# Patient Record
Sex: MALE | Race: WHITE | NOT HISPANIC OR LATINO | Employment: FULL TIME | URBAN - METROPOLITAN AREA
[De-identification: names, ages, dates, MRNs, and addresses within clinical notes are randomized per-mention and may not be internally consistent; named-entity substitution may affect disease eponyms.]

---

## 2022-07-13 ENCOUNTER — APPOINTMENT (INPATIENT)
Dept: NON INVASIVE DIAGNOSTICS | Facility: HOSPITAL | Age: 57
DRG: 247 | End: 2022-07-13
Payer: COMMERCIAL

## 2022-07-13 ENCOUNTER — HOSPITAL ENCOUNTER (EMERGENCY)
Facility: HOSPITAL | Age: 57
End: 2022-07-13
Attending: EMERGENCY MEDICINE | Admitting: EMERGENCY MEDICINE
Payer: COMMERCIAL

## 2022-07-13 ENCOUNTER — DOCUMENTATION (OUTPATIENT)
Dept: NON INVASIVE DIAGNOSTICS | Facility: HOSPITAL | Age: 57
End: 2022-07-13

## 2022-07-13 ENCOUNTER — HOSPITAL ENCOUNTER (INPATIENT)
Facility: HOSPITAL | Age: 57
LOS: 2 days | Discharge: HOME | DRG: 247 | End: 2022-07-15
Attending: INTERNAL MEDICINE | Admitting: INTERNAL MEDICINE
Payer: COMMERCIAL

## 2022-07-13 ENCOUNTER — APPOINTMENT (EMERGENCY)
Dept: RADIOLOGY | Facility: HOSPITAL | Age: 57
End: 2022-07-13
Payer: COMMERCIAL

## 2022-07-13 VITALS
DIASTOLIC BLOOD PRESSURE: 75 MMHG | HEART RATE: 75 BPM | SYSTOLIC BLOOD PRESSURE: 117 MMHG | WEIGHT: 187.83 LBS | BODY MASS INDEX: 30.32 KG/M2 | RESPIRATION RATE: 21 BRPM | OXYGEN SATURATION: 98 % | TEMPERATURE: 98.3 F

## 2022-07-13 DIAGNOSIS — I21.3 STEMI (ST ELEVATION MYOCARDIAL INFARCTION) (HCC): Primary | ICD-10-CM

## 2022-07-13 PROBLEM — I21.02 STEMI INVOLVING LEFT ANTERIOR DESCENDING CORONARY ARTERY (HCC): Status: ACTIVE | Noted: 2022-07-13

## 2022-07-13 LAB
ALBUMIN SERPL BCP-MCNC: 3.7 G/DL (ref 3.5–5)
ALP SERPL-CCNC: 104 U/L (ref 46–116)
ALT SERPL W P-5'-P-CCNC: 84 U/L (ref 12–78)
ANION GAP SERPL CALCULATED.3IONS-SCNC: 12 MMOL/L (ref 4–13)
AORTIC ROOT: 2.8 CM
APICAL FOUR CHAMBER EJECTION FRACTION: 54 %
APTT PPP: 27 SECONDS (ref 23–37)
ASCENDING AORTA: 3.1 CM
AST SERPL W P-5'-P-CCNC: 35 U/L (ref 5–45)
ATRIAL RATE: 74 BPM
ATRIAL RATE: 86 BPM
BASOPHILS # BLD AUTO: 0.03 THOUSANDS/ΜL (ref 0–0.1)
BASOPHILS NFR BLD AUTO: 0 % (ref 0–1)
BILIRUB SERPL-MCNC: 0.65 MG/DL (ref 0.2–1)
BUN SERPL-MCNC: 11 MG/DL (ref 5–25)
CALCIUM SERPL-MCNC: 9 MG/DL (ref 8.3–10.1)
CARDIAC TROPONIN I PNL SERPL HS: 485 NG/L
CARDIAC TROPONIN I PNL SERPL HS: ABNORMAL NG/L
CHLORIDE SERPL-SCNC: 102 MMOL/L (ref 100–108)
CHOLEST SERPL-MCNC: 222 MG/DL
CO2 SERPL-SCNC: 22 MMOL/L (ref 21–32)
CREAT SERPL-MCNC: 1.2 MG/DL (ref 0.6–1.3)
E WAVE DECELERATION TIME: 156 MS
EOSINOPHIL # BLD AUTO: 0.09 THOUSAND/ΜL (ref 0–0.61)
EOSINOPHIL NFR BLD AUTO: 1 % (ref 0–6)
ERYTHROCYTE [DISTWIDTH] IN BLOOD BY AUTOMATED COUNT: 12.3 % (ref 11.6–15.1)
FRACTIONAL SHORTENING: 31 (ref 28–44)
GFR SERPL CREATININE-BSD FRML MDRD: 67 ML/MIN/1.73SQ M
GLUCOSE SERPL-MCNC: 170 MG/DL (ref 65–140)
GLUCOSE SERPL-MCNC: 171 MG/DL (ref 65–140)
HCT VFR BLD AUTO: 46.8 % (ref 36.5–49.3)
HDLC SERPL-MCNC: 46 MG/DL
HGB BLD-MCNC: 15.8 G/DL (ref 12–17)
IMM GRANULOCYTES # BLD AUTO: 0.02 THOUSAND/UL (ref 0–0.2)
IMM GRANULOCYTES NFR BLD AUTO: 0 % (ref 0–2)
INR PPP: 0.96 (ref 0.84–1.19)
INTERVENTRICULAR SEPTUM IN DIASTOLE (PARASTERNAL SHORT AXIS VIEW): 1 CM
INTERVENTRICULAR SEPTUM: 1 CM (ref 0.6–1.1)
KCT BLD-ACNC: 231 SEC (ref 89–137)
LAAS-AP2: 12.1 CM2
LAAS-AP4: 14.5 CM2
LDLC SERPL CALC-MCNC: 153 MG/DL (ref 0–100)
LDLC SERPL DIRECT ASSAY-MCNC: 154 MG/DL (ref 0–100)
LEFT ATRIUM SIZE: 2.8 CM
LEFT INTERNAL DIMENSION IN SYSTOLE: 3.3 CM (ref 2.1–4)
LEFT VENTRICULAR INTERNAL DIMENSION IN DIASTOLE: 4.8 CM (ref 3.5–6)
LEFT VENTRICULAR POSTERIOR WALL IN END DIASTOLE: 0.9 CM
LEFT VENTRICULAR STROKE VOLUME: 63 ML
LVSV (TEICH): 63 ML
LYMPHOCYTES # BLD AUTO: 1.94 THOUSANDS/ΜL (ref 0.6–4.47)
LYMPHOCYTES NFR BLD AUTO: 25 % (ref 14–44)
MAGNESIUM SERPL-MCNC: 1.8 MG/DL (ref 1.6–2.6)
MCH RBC QN AUTO: 30.6 PG (ref 26.8–34.3)
MCHC RBC AUTO-ENTMCNC: 33.8 G/DL (ref 31.4–37.4)
MCV RBC AUTO: 91 FL (ref 82–98)
MONOCYTES # BLD AUTO: 0.74 THOUSAND/ΜL (ref 0.17–1.22)
MONOCYTES NFR BLD AUTO: 10 % (ref 4–12)
MV E'TISSUE VEL-SEP: 8 CM/S
MV PEAK A VEL: 0.89 M/S
MV PEAK E VEL: 90 CM/S
MV STENOSIS PRESSURE HALF TIME: 45 MS
MV VALVE AREA P 1/2 METHOD: 4.89
NEUTROPHILS # BLD AUTO: 4.99 THOUSANDS/ΜL (ref 1.85–7.62)
NEUTS SEG NFR BLD AUTO: 64 % (ref 43–75)
NONHDLC SERPL-MCNC: 176 MG/DL
NRBC BLD AUTO-RTO: 0 /100 WBCS
NT-PROBNP SERPL-MCNC: 864 PG/ML
P AXIS: 59 DEGREES
P AXIS: 75 DEGREES
PLATELET # BLD AUTO: 304 THOUSANDS/UL (ref 149–390)
PMV BLD AUTO: 8.4 FL (ref 8.9–12.7)
POTASSIUM SERPL-SCNC: 3.6 MMOL/L (ref 3.5–5.3)
PR INTERVAL: 124 MS
PR INTERVAL: 124 MS
PROT SERPL-MCNC: 7 G/DL (ref 6.4–8.2)
PROTHROMBIN TIME: 12.3 SECONDS (ref 11.6–14.5)
QRS AXIS: 10 DEGREES
QRS AXIS: 95 DEGREES
QRSD INTERVAL: 78 MS
QRSD INTERVAL: 84 MS
QT INTERVAL: 390 MS
QT INTERVAL: 404 MS
QTC INTERVAL: 432 MS
QTC INTERVAL: 483 MS
RBC # BLD AUTO: 5.17 MILLION/UL (ref 3.88–5.62)
RIGHT ATRIUM AREA SYSTOLE A4C: 13.1 CM2
RIGHT VENTRICLE ID DIMENSION: 3.3 CM
SL CV LEFT ATRIUM LENGTH A2C: 4.5 CM
SL CV LV EF: 50
SL CV PED ECHO LEFT VENTRICLE DIASTOLIC VOLUME (MOD BIPLANE) 2D: 107 ML
SL CV PED ECHO LEFT VENTRICLE SYSTOLIC VOLUME (MOD BIPLANE) 2D: 44 ML
SODIUM SERPL-SCNC: 136 MMOL/L (ref 136–145)
SPECIMEN SOURCE: ABNORMAL
T WAVE AXIS: 2 DEGREES
T WAVE AXIS: 66 DEGREES
TRIGL SERPL-MCNC: 114 MG/DL
VENTRICULAR RATE: 74 BPM
VENTRICULAR RATE: 86 BPM
WBC # BLD AUTO: 7.81 THOUSAND/UL (ref 4.31–10.16)

## 2022-07-13 PROCEDURE — 93005 ELECTROCARDIOGRAM TRACING: CPT

## 2022-07-13 PROCEDURE — 83036 HEMOGLOBIN GLYCOSYLATED A1C: CPT | Performed by: STUDENT IN AN ORGANIZED HEALTH CARE EDUCATION/TRAINING PROGRAM

## 2022-07-13 PROCEDURE — 93010 ELECTROCARDIOGRAM REPORT: CPT | Performed by: INTERNAL MEDICINE

## 2022-07-13 PROCEDURE — C1887 CATHETER, GUIDING: HCPCS | Performed by: INTERNAL MEDICINE

## 2022-07-13 PROCEDURE — C1769 GUIDE WIRE: HCPCS | Performed by: INTERNAL MEDICINE

## 2022-07-13 PROCEDURE — 99153 MOD SED SAME PHYS/QHP EA: CPT | Performed by: INTERNAL MEDICINE

## 2022-07-13 PROCEDURE — 83735 ASSAY OF MAGNESIUM: CPT | Performed by: PHYSICIAN ASSISTANT

## 2022-07-13 PROCEDURE — 84484 ASSAY OF TROPONIN QUANT: CPT

## 2022-07-13 PROCEDURE — 99285 EMERGENCY DEPT VISIT HI MDM: CPT

## 2022-07-13 PROCEDURE — 36415 COLL VENOUS BLD VENIPUNCTURE: CPT | Performed by: STUDENT IN AN ORGANIZED HEALTH CARE EDUCATION/TRAINING PROGRAM

## 2022-07-13 PROCEDURE — C1874 STENT, COATED/COV W/DEL SYS: HCPCS | Performed by: INTERNAL MEDICINE

## 2022-07-13 PROCEDURE — NC001 PR NO CHARGE: Performed by: INTERNAL MEDICINE

## 2022-07-13 PROCEDURE — 85347 COAGULATION TIME ACTIVATED: CPT

## 2022-07-13 PROCEDURE — 80053 COMPREHEN METABOLIC PANEL: CPT | Performed by: PHYSICIAN ASSISTANT

## 2022-07-13 PROCEDURE — 85610 PROTHROMBIN TIME: CPT | Performed by: PHYSICIAN ASSISTANT

## 2022-07-13 PROCEDURE — 82948 REAGENT STRIP/BLOOD GLUCOSE: CPT

## 2022-07-13 PROCEDURE — 85025 COMPLETE CBC W/AUTO DIFF WBC: CPT | Performed by: PHYSICIAN ASSISTANT

## 2022-07-13 PROCEDURE — 93306 TTE W/DOPPLER COMPLETE: CPT

## 2022-07-13 PROCEDURE — 93454 CORONARY ARTERY ANGIO S&I: CPT | Performed by: INTERNAL MEDICINE

## 2022-07-13 PROCEDURE — 99232 SBSQ HOSP IP/OBS MODERATE 35: CPT | Performed by: INTERNAL MEDICINE

## 2022-07-13 PROCEDURE — 93306 TTE W/DOPPLER COMPLETE: CPT | Performed by: INTERNAL MEDICINE

## 2022-07-13 PROCEDURE — 83036 HEMOGLOBIN GLYCOSYLATED A1C: CPT

## 2022-07-13 PROCEDURE — 93458 L HRT ARTERY/VENTRICLE ANGIO: CPT | Performed by: INTERNAL MEDICINE

## 2022-07-13 PROCEDURE — 83880 ASSAY OF NATRIURETIC PEPTIDE: CPT | Performed by: PHYSICIAN ASSISTANT

## 2022-07-13 PROCEDURE — 99152 MOD SED SAME PHYS/QHP 5/>YRS: CPT | Performed by: INTERNAL MEDICINE

## 2022-07-13 PROCEDURE — C1894 INTRO/SHEATH, NON-LASER: HCPCS | Performed by: INTERNAL MEDICINE

## 2022-07-13 PROCEDURE — 96374 THER/PROPH/DIAG INJ IV PUSH: CPT

## 2022-07-13 PROCEDURE — 80061 LIPID PANEL: CPT | Performed by: PHYSICIAN ASSISTANT

## 2022-07-13 PROCEDURE — C9606 PERC D-E COR REVASC W AMI S: HCPCS | Performed by: INTERNAL MEDICINE

## 2022-07-13 PROCEDURE — 85730 THROMBOPLASTIN TIME PARTIAL: CPT | Performed by: PHYSICIAN ASSISTANT

## 2022-07-13 PROCEDURE — 027035Z DILATION OF CORONARY ARTERY, ONE ARTERY WITH TWO DRUG-ELUTING INTRALUMINAL DEVICES, PERCUTANEOUS APPROACH: ICD-10-PCS | Performed by: INTERNAL MEDICINE

## 2022-07-13 PROCEDURE — 71045 X-RAY EXAM CHEST 1 VIEW: CPT

## 2022-07-13 PROCEDURE — 99291 CRITICAL CARE FIRST HOUR: CPT | Performed by: EMERGENCY MEDICINE

## 2022-07-13 PROCEDURE — 84484 ASSAY OF TROPONIN QUANT: CPT | Performed by: PHYSICIAN ASSISTANT

## 2022-07-13 PROCEDURE — 92941 PRQ TRLML REVSC TOT OCCL AMI: CPT | Performed by: INTERNAL MEDICINE

## 2022-07-13 PROCEDURE — B2111ZZ FLUOROSCOPY OF MULTIPLE CORONARY ARTERIES USING LOW OSMOLAR CONTRAST: ICD-10-PCS | Performed by: INTERNAL MEDICINE

## 2022-07-13 PROCEDURE — C1725 CATH, TRANSLUMIN NON-LASER: HCPCS | Performed by: INTERNAL MEDICINE

## 2022-07-13 PROCEDURE — 83721 ASSAY OF BLOOD LIPOPROTEIN: CPT | Performed by: STUDENT IN AN ORGANIZED HEALTH CARE EDUCATION/TRAINING PROGRAM

## 2022-07-13 DEVICE — XIENCE SKYPOINT™ EVEROLIMUS ELUTING CORONARY STENT SYSTEM 3.00 MM X 18 MM / RAPID-EXCHANGE
Type: IMPLANTABLE DEVICE | Site: CORONARY | Status: FUNCTIONAL
Brand: XIENCE SKYPOINT™

## 2022-07-13 DEVICE — XIENCE SKYPOINT™ EVEROLIMUS ELUTING CORONARY STENT SYSTEM 3.50 MM X 12 MM / RAPID-EXCHANGE
Type: IMPLANTABLE DEVICE | Site: CORONARY | Status: FUNCTIONAL
Brand: XIENCE SKYPOINT™

## 2022-07-13 RX ORDER — FENTANYL CITRATE 50 UG/ML
INJECTION, SOLUTION INTRAMUSCULAR; INTRAVENOUS AS NEEDED
Status: DISCONTINUED | OUTPATIENT
Start: 2022-07-13 | End: 2022-07-13 | Stop reason: HOSPADM

## 2022-07-13 RX ORDER — ONDANSETRON 2 MG/ML
4 INJECTION INTRAMUSCULAR; INTRAVENOUS EVERY 6 HOURS PRN
Status: DISCONTINUED | OUTPATIENT
Start: 2022-07-13 | End: 2022-07-15 | Stop reason: HOSPADM

## 2022-07-13 RX ORDER — SODIUM CHLORIDE 9 MG/ML
100 INJECTION, SOLUTION INTRAVENOUS CONTINUOUS
Status: DISPENSED | OUTPATIENT
Start: 2022-07-13 | End: 2022-07-13

## 2022-07-13 RX ORDER — VERAPAMIL HCL 2.5 MG/ML
AMPUL (ML) INTRAVENOUS AS NEEDED
Status: DISCONTINUED | OUTPATIENT
Start: 2022-07-13 | End: 2022-07-13 | Stop reason: HOSPADM

## 2022-07-13 RX ORDER — METOPROLOL TARTRATE 50 MG/1
25 TABLET, FILM COATED ORAL EVERY 12 HOURS SCHEDULED
Status: DISCONTINUED | OUTPATIENT
Start: 2022-07-13 | End: 2022-07-14

## 2022-07-13 RX ORDER — MIDAZOLAM HYDROCHLORIDE 2 MG/2ML
INJECTION, SOLUTION INTRAMUSCULAR; INTRAVENOUS AS NEEDED
Status: DISCONTINUED | OUTPATIENT
Start: 2022-07-13 | End: 2022-07-13 | Stop reason: HOSPADM

## 2022-07-13 RX ORDER — NITROGLYCERIN 20 MG/100ML
INJECTION INTRAVENOUS AS NEEDED
Status: DISCONTINUED | OUTPATIENT
Start: 2022-07-13 | End: 2022-07-13 | Stop reason: HOSPADM

## 2022-07-13 RX ORDER — HEPARIN SODIUM 5000 [USP'U]/ML
5000 INJECTION, SOLUTION INTRAVENOUS; SUBCUTANEOUS EVERY 8 HOURS SCHEDULED
Status: DISCONTINUED | OUTPATIENT
Start: 2022-07-13 | End: 2022-07-15 | Stop reason: HOSPADM

## 2022-07-13 RX ORDER — NICOTINE 21 MG/24HR
1 PATCH, TRANSDERMAL 24 HOURS TRANSDERMAL DAILY
Status: DISCONTINUED | OUTPATIENT
Start: 2022-07-13 | End: 2022-07-15 | Stop reason: HOSPADM

## 2022-07-13 RX ORDER — HEPARIN SODIUM 1000 [USP'U]/ML
4000 INJECTION, SOLUTION INTRAVENOUS; SUBCUTANEOUS ONCE
Status: COMPLETED | OUTPATIENT
Start: 2022-07-13 | End: 2022-07-13

## 2022-07-13 RX ORDER — HEPARIN SODIUM 1000 [USP'U]/ML
INJECTION, SOLUTION INTRAVENOUS; SUBCUTANEOUS AS NEEDED
Status: DISCONTINUED | OUTPATIENT
Start: 2022-07-13 | End: 2022-07-13 | Stop reason: HOSPADM

## 2022-07-13 RX ORDER — SODIUM CHLORIDE 9 MG/ML
3 INJECTION INTRAVENOUS
Status: DISCONTINUED | OUTPATIENT
Start: 2022-07-13 | End: 2022-07-13 | Stop reason: HOSPADM

## 2022-07-13 RX ORDER — SODIUM CHLORIDE 9 MG/ML
INJECTION, SOLUTION INTRAVENOUS
Status: COMPLETED | OUTPATIENT
Start: 2022-07-13 | End: 2022-07-13

## 2022-07-13 RX ORDER — ATORVASTATIN CALCIUM 80 MG/1
80 TABLET, FILM COATED ORAL
Status: DISCONTINUED | OUTPATIENT
Start: 2022-07-13 | End: 2022-07-15 | Stop reason: HOSPADM

## 2022-07-13 RX ORDER — ASPIRIN 81 MG/1
81 TABLET ORAL DAILY
Status: DISCONTINUED | OUTPATIENT
Start: 2022-07-14 | End: 2022-07-15 | Stop reason: HOSPADM

## 2022-07-13 RX ORDER — ONDANSETRON 2 MG/ML
1 INJECTION INTRAMUSCULAR; INTRAVENOUS ONCE
Status: COMPLETED | OUTPATIENT
Start: 2022-07-13 | End: 2022-07-13

## 2022-07-13 RX ORDER — NITROGLYCERIN 0.4 MG/1
0.4 TABLET SUBLINGUAL
Status: DISCONTINUED | OUTPATIENT
Start: 2022-07-13 | End: 2022-07-15 | Stop reason: HOSPADM

## 2022-07-13 RX ORDER — ACETAMINOPHEN 325 MG/1
650 TABLET ORAL EVERY 4 HOURS PRN
Status: DISCONTINUED | OUTPATIENT
Start: 2022-07-13 | End: 2022-07-15 | Stop reason: HOSPADM

## 2022-07-13 RX ORDER — LIDOCAINE HYDROCHLORIDE 10 MG/ML
INJECTION, SOLUTION EPIDURAL; INFILTRATION; INTRACAUDAL; PERINEURAL AS NEEDED
Status: DISCONTINUED | OUTPATIENT
Start: 2022-07-13 | End: 2022-07-13 | Stop reason: HOSPADM

## 2022-07-13 RX ADMIN — HEPARIN SODIUM 5000 UNITS: 5000 INJECTION INTRAVENOUS; SUBCUTANEOUS at 21:31

## 2022-07-13 RX ADMIN — ATORVASTATIN CALCIUM 80 MG: 80 TABLET, FILM COATED ORAL at 17:23

## 2022-07-13 RX ADMIN — METOPROLOL TARTRATE 25 MG: 50 TABLET, FILM COATED ORAL at 21:31

## 2022-07-13 RX ADMIN — TICAGRELOR 90 MG: 90 TABLET ORAL at 17:23

## 2022-07-13 RX ADMIN — SODIUM CHLORIDE 100 ML/HR: 0.9 INJECTION, SOLUTION INTRAVENOUS at 12:02

## 2022-07-13 RX ADMIN — NICOTINE 1 PATCH: 14 PATCH, EXTENDED RELEASE TRANSDERMAL at 12:04

## 2022-07-13 RX ADMIN — HEPARIN SODIUM 4000 UNITS: 1000 INJECTION INTRAVENOUS; SUBCUTANEOUS at 09:41

## 2022-07-13 RX ADMIN — TICAGRELOR 180 MG: 90 TABLET ORAL at 09:38

## 2022-07-13 NOTE — ED PROVIDER NOTES
History  Chief Complaint   Patient presents with    Chest Pain     Chest pain since last night  Mid sternal pain radiating to back and shoulder blades  Pt diaphoretic on arrival        28-year-old male presents via EMS for chest pain  Chest pain feels like a pressure sensation patient's midsternal region with radiation to his back and neck  Patient reports the pain started some point overnight however greatly worsened about a half an hour prior to arrival   For EMS patient had apparent ST elevations, patient was made a MI alert on arrival with EKG showing ST elevations with inferior reciprocal changes  Patient reports diaphoresis, nausea without vomiting  He denies any pain in his extremities  He has no known medical conditions or daily medications  Patient was given 4 baby aspirin via EMS in route  None       History reviewed  No pertinent past medical history  History reviewed  No pertinent surgical history  History reviewed  No pertinent family history  I have reviewed and agree with the history as documented  E-Cigarette/Vaping     E-Cigarette/Vaping Substances     Social History     Tobacco Use    Smoking status: Current Every Day Smoker     Packs/day: 1 50     Types: Cigarettes    Smokeless tobacco: Never Used   Substance Use Topics    Alcohol use: No    Drug use: No       Review of Systems   Constitutional: Positive for diaphoresis  Respiratory: Positive for shortness of breath  Cardiovascular: Positive for chest pain  Gastrointestinal: Positive for nausea  Negative for abdominal pain and vomiting  Musculoskeletal: Negative for arthralgias  Neurological: Negative for weakness and numbness  All other systems reviewed and are negative  Physical Exam  Physical Exam  Vitals reviewed  Constitutional:       Appearance: He is well-developed  He is ill-appearing and diaphoretic  HENT:      Head: Normocephalic and atraumatic        Right Ear: External ear normal  Left Ear: External ear normal       Nose: Nose normal    Eyes:      General: No scleral icterus  Right eye: No discharge  Left eye: No discharge  Extraocular Movements: Extraocular movements intact  Pupils: Pupils are equal, round, and reactive to light  Cardiovascular:      Rate and Rhythm: Normal rate and regular rhythm  Pulses: Normal pulses  Radial pulses are 2+ on the right side and 2+ on the left side  Dorsalis pedis pulses are 2+ on the right side and 2+ on the left side  Heart sounds: Normal heart sounds  Pulmonary:      Effort: Pulmonary effort is normal  No respiratory distress  Abdominal:      General: There is no distension  Palpations: Abdomen is soft  Tenderness: There is no abdominal tenderness  There is no guarding or rebound  Musculoskeletal:         General: No deformity or signs of injury  Right lower leg: No edema  Left lower leg: No edema  Skin:     General: Skin is warm  Coloration: Skin is not jaundiced or pale  Neurological:      General: No focal deficit present  Mental Status: He is alert  Mental status is at baseline           Vital Signs  ED Triage Vitals   Temperature Pulse Respirations Blood Pressure SpO2   07/13/22 0929 07/13/22 0929 07/13/22 0929 07/13/22 0929 07/13/22 0929   98 3 °F (36 8 °C) 87 (!) 24 117/72 98 %      Temp Source Heart Rate Source Patient Position - Orthostatic VS BP Location FiO2 (%)   07/13/22 0929 07/13/22 0929 -- -- --   Tympanic Monitor         Pain Score       07/13/22 0931       6           Vitals:    07/13/22 0940 07/13/22 0945 07/13/22 0950 07/13/22 0955   BP: 117/73 116/75 112/73 117/75   Pulse: 78 81 73 75         Visual Acuity      ED Medications  Medications   ondansetron (FOR EMS ONLY) (ZOFRAN) 4 mg/2 mL injection 4 mg (0 mg Does not apply Given to EMS 7/13/22 0937)   ticagrelor (BRILINTA) tablet 180 mg (180 mg Oral Given 7/13/22 0938)   heparin (porcine) injection 4,000 Units (4,000 Units Intravenous Given 7/13/22 0941)       Diagnostic Studies  Results Reviewed     Procedure Component Value Units Date/Time    LDL cholesterol, direct [[id number]]  (Abnormal) Collected: 07/13/22 0932    Lab Status: Final result Specimen: Blood Updated: 07/13/22 1035     LDL Direct 154 mg/dl     Narrative:      LDL Cholesterol:        Optimal           <100 mg/dl      Near Optimal      100-129 mg/dl      Above Optimal       Borderline High  130-159 mg/dl       High             160-189 mg/dl       Very High        >189 mg/dl    Hemoglobin A1C [[id number]] Collected: 07/13/22 0932    Lab Status:  In process Specimen: Blood from Arm, Left Updated: 07/13/22 1018    Lipid panel [[id number]]  (Abnormal) Collected: 07/13/22 0932    Lab Status: Final result Specimen: Blood from Arm, Left Updated: 07/13/22 1000     Cholesterol 222 mg/dL      Triglycerides 114 mg/dL      HDL, Direct 46 mg/dL      LDL Calculated 153 mg/dL      Non-HDL-Chol (CHOL-HDL) 176 mg/dl     Magnesium [[id number]]  (Normal) Collected: 07/13/22 0932    Lab Status: Final result Specimen: Blood from Arm, Left Updated: 07/13/22 1000     Magnesium 1 8 mg/dL     HS Troponin 0hr (reflex protocol) [[id number]]  (Abnormal) Collected: 07/13/22 0932    Lab Status: Final result Specimen: Blood from Arm, Left Updated: 07/13/22 1000     hs TnI 0hr 485 ng/L     NT-BNP PRO [[id number]]  (Abnormal) Collected: 07/13/22 0932    Lab Status: Final result Specimen: Blood from Arm, Left Updated: 07/13/22 1000     NT-proBNP 864 pg/mL     Comprehensive metabolic panel [[id number]]  (Abnormal) Collected: 07/13/22 0932    Lab Status: Final result Specimen: Blood from Arm, Left Updated: 07/13/22 0952     Sodium 136 mmol/L      Potassium 3 6 mmol/L      Chloride 102 mmol/L      CO2 22 mmol/L      ANION GAP 12 mmol/L      BUN 11 mg/dL      Creatinine 1 20 mg/dL      Glucose 171 mg/dL      Calcium 9 0 mg/dL      AST 35 U/L      ALT 84 U/L      Alkaline Phosphatase 104 U/L      Total Protein 7 0 g/dL      Albumin 3 7 g/dL      Total Bilirubin 0 65 mg/dL      eGFR 67 ml/min/1 73sq m     Narrative:      [place] guidelines for Chronic Kidney Disease (CKD):     Stage 1 with normal or high GFR (GFR > 90 mL/min/1 73 square meters)    Stage 2 Mild CKD (GFR = 60-89 mL/min/1 73 square meters)    Stage 3A Moderate CKD (GFR = 45-59 mL/min/1 73 square meters)    Stage 3B Moderate CKD (GFR = 30-44 mL/min/1 73 square meters)    Stage 4 Severe CKD (GFR = 15-29 mL/min/1 73 square meters)    Stage 5 End Stage CKD (GFR <15 mL/min/1 73 square meters)  Note: GFR calculation is accurate only with a steady state creatinine    Protime-INR [[id number]]  (Normal) Collected: 07/13/22 0932    Lab Status: Final result Specimen: Blood from Arm, Left Updated: 07/13/22 0949     Protime 12 3 seconds      INR 0 96    APTT [[id number]]  (Normal) Collected: 07/13/22 0932    Lab Status: Final result Specimen: Blood from Arm, Left Updated: 07/13/22 0949     PTT 27 seconds     CBC and differential [[id number]]  (Abnormal) Collected: 07/13/22 0932    Lab Status: Final result Specimen: Blood from Arm, Left Updated: 07/13/22 0938     WBC 7 81 Thousand/uL      RBC 5 17 Million/uL      Hemoglobin 15 8 g/dL      Hematocrit 46 8 %      MCV 91 fL      MCH 30 6 pg      MCHC 33 8 g/dL      RDW 12 3 %      MPV 8 4 fL      Platelets 508 Thousands/uL      nRBC 0 /100 WBCs      Neutrophils Relative 64 %      Immat GRANS % 0 %      Lymphocytes Relative 25 %      Monocytes Relative 10 %      Eosinophils Relative 1 %      Basophils Relative 0 %      Neutrophils Absolute 4 99 Thousands/µL      Immature Grans Absolute 0 02 Thousand/uL      Lymphocytes Absolute 1 94 Thousands/µL      Monocytes Absolute 0 74 Thousand/µL      Eosinophils Absolute 0 09 Thousand/µL      Basophils Absolute 0 03 Thousands/µL     Fingerstick Glucose (POCT) [[id number]]  (Abnormal) Collected: 07/13/22 0929    Lab Status: Final result Updated: 07/13/22 0930     POC Glucose 170 mg/dl                  XR chest 1 view portable   Final Result by Torin Diaz MD (07/13 1105)      Clear lungs  Workstation performed: [id number]                    Procedures  CriticalCare Time  Performed by: Gabby Hoang DO  Authorized by: Gabby Hoang DO     Critical care provider statement:     Critical care time (minutes):  30    Critical care was necessary to treat or prevent imminent or life-threatening deterioration of the following conditions:  Cardiac failure    Critical care was time spent personally by me on the following activities:  Obtaining history from patient or surrogate, development of treatment plan with patient or surrogate, discussions with consultants, examination of patient, evaluation of patient's response to treatment, ordering and performing treatments and interventions, review of old charts and re-evaluation of patient's condition    I assumed direction of critical care for this patient from another provider in my specialty: no               ED Course  ED Course as of 07/13/22 1549   Wed Jul 13, 2022   0940 Procedure Note: EKG  Date/Time: 07/13/22 9:40 AM   Interpreted by: Julio Cesar Jiménez  Indications / Diagnosis: CP/MI alert  ECG reviewed by me, the ED Provider: yes   The EKG demonstrates:  Rhythm: normal sinus  Intervals: normal intervals  Axis: normal axis  QRS/Blocks: normal QRS  ST Changes: ST elevations to anterior-septal with inferior reciprocal changes       0950 MI alert called/accepted by Dr Senait Orantes in about 5min                                             MDM  Number of Diagnoses or Management Options  STEMI (ST elevation myocardial infarction) [facility])  Diagnosis management comments: 60-year-old male presents for evaluation of chest pain  Patient's appearance STEMI on EKG, case discussed with Dr Caitlin Lott for STEMI alert and cath lab intervention, he accepts    Mi alert labs and medications ordered per order set, patient is arranged for transportation via helicopter  Disposition  Final diagnoses:   STEMI (ST elevation myocardial infarction) ([facility] [address] )     Time reflects when diagnosis was documented in both MDM as applicable and the Disposition within this note     Time User Action Codes Description Comment    7/13/2022 10:09 AM Reina Signs Add [I21 3] STEMI (ST elevation myocardial infarction) [facility])       ED Disposition     ED Disposition   Transfer to Another Facility-In Network    Condition   --    Date/Time   Wed Jul 13, 2022  9:47 AM    Comment   Bruno Burns should be transferred out to [facility] [place]             MD Documentation    [facility] Most Recent Value   Patient Condition The patient has been stabilized such that within reasonable medical probability, no material deterioration of the patient condition or the condition of the unborn child(valeria) is likely to result from the transfer   Reason for Transfer Level of Care needed not available at this facility   Benefits of Transfer Specialized equipment and/or services available at the receiving facility (Include comment)________________________   Risks of Transfer Potential for delay in receiving treatment, Potential deterioration of medical condition, Loss of IV, Possible worsening of condition or death during transfer, Increased discomfort during transfer   Accepting Physician Amrik Resendez [address] Name, Mary moeller   Provider Certification General risk, such as traffic hazards, adverse weather conditions, rough terrain or turbulence, possible failure of equipment (including vehicle or aircraft), or consequences of actions of persons outside the control of the transport personnel      RN Documentation    Flowsheet Row Most [address] [facility] Name, [address]  [facility] [place]   Bed Assignment cath lab   Report Given to [facility]      Follow-up Information    None         There are no discharge medications for this patient  No discharge procedures on file      PDMP Review     None          ED Provider  Electronically Signed by           Matthew Delgado DO  07/13/22 9016

## 2022-07-13 NOTE — EMTALA/ACUTE CARE TRANSFER
[address] [facility] EMERGENCY DEPARTMENT  [address] [facility] [address]  Dept: [phone]      EMTALA TRANSFER CONSENT    NAME Kaity Ragsdale                                         1965                              MRN [id number]    I have been informed of my rights regarding examination, treatment, and transfer   by Dr Gabby Hoang DO    Benefits: Specialized equipment and/or services available at the receiving facility (Include comment)________________________    Risks: Potential for delay in receiving treatment, Potential deterioration of medical condition, Loss of IV, Possible worsening of condition or death during transfer, Increased discomfort during transfer      Consent for Transfer:  I acknowledge that my medical condition has been evaluated and explained to me by the emergency department physician or other qualified medical person and/or my attending physician, who has recommended that I be transferred to the service of  Accepting Physician: Dr Arbie Saint at [address] Name, [address] : [facility]  The above potential benefits of such transfer, the potential risks associated with such transfer, and the probable risks of not being transferred have been explained to me, and I fully understand them  The doctor has explained that, in my case, the benefits of transfer outweigh the risks  I agree to be transferred  I authorize the performance of emergency medical procedures and treatments upon me in both transit and upon arrival at the receiving facility  Additionally, I authorize the release of any and all medical records to the receiving facility and request they be transported with me, if possible  I understand that the safest mode of transportation during a medical emergency is an ambulance and that the Hospital advocates the use of this mode of transport   Risks of traveling to the receiving facility by car, including absence of medical control, life sustaining equipment, such as oxygen, and medical personnel has been explained to me and I fully understand them  (MONICA CORRECT BOX BELOW)  [  ]  I consent to the stated transfer and to be transported by ambulance/helicopter  [  ]  I consent to the stated transfer, but refuse transportation by ambulance and accept full responsibility for my transportation by car  I understand the risks of non-ambulance transfers and I exonerate the Hospital and its staff from any deterioration in my condition that results from this refusal     X___________________________________________    DATE  22  TIME________  Signature of patient or legally responsible individual signing on patient behalf           RELATIONSHIP TO PATIENT_________________________          Provider Certification    NAME Candelariabonitawillis Grewal                                         1965                              MRN [id number]    A medical screening exam was performed on the above named patient  Based on the examination:    Condition Necessitating Transfer There were no encounter diagnoses      Patient Condition: The patient has been stabilized such that within reasonable medical probability, no material deterioration of the patient condition or the condition of the unborn child(valeria) is likely to result from the transfer    Reason for Transfer: Level of Care needed not available at this facility    Transfer Requirements: Facility [facility] [place]   · Space available and qualified personnel available for treatment as acknowledged by    · Agreed to accept transfer and to provide appropriate medical treatment as acknowledged by       Dr Jenean Osler  · Appropriate medical records of the examination and treatment of the patient are provided at the time of transfer   [address], [address] _______  · Transfer will be performed by qualified personnel from    and appropriate transfer equipment as required, including the use of necessary and appropriate life support measures  Provider Certification: I have examined the patient and explained the following risks and benefits of being transferred/refusing transfer to the patient/family:  General risk, such as traffic hazards, adverse weather conditions, rough terrain or turbulence, possible failure of equipment (including vehicle or aircraft), or consequences of actions of persons outside the control of the transport personnel      Based on these reasonable risks and benefits to the patient and/or the unborn child(valeria), and based upon the information available at the time of the patients examination, I certify that the medical benefits reasonably to be expected from the provision of appropriate medical treatments at another medical facility outweigh the increasing risks, if any, to the individuals medical condition, and in the case of labor to the unborn child, from effecting the transfer      X____________________________________________ DATE 07/13/22        TIME_______      ORIGINAL - SEND TO MEDICAL RECORDS   COPY - SEND WITH PATIENT DURING TRANSFER

## 2022-07-13 NOTE — H&P
Cardiology Team STEMI Inpatient - History & Physical  Ken Pope 65 y o  male MRN: [id number]  Unit/Bed#: TR05 Encounter: [id number]        PCP: No primary care provider on file  Outpatient Cardiologist: none     Risk factors:  - none     History of Present Illness            HPI:  Ken Pope is a 65 y o  male with no significant past medical history initially presented to [facility] with chief complaint of chest pain  Patient was found to have inferior STEMI based on EKG  Patient is being transferred to [facility] for cardiac catheterization  His pain started at 9 pm yesterday night with CP, nausea and SOB  Symptoms were worsening which prompted him to go to ER       On My exam, patient is alert and oriented x3  He has 3/10 chest pain which is better than yesterday  He was anxious and diaphoretic  He is an active smoker with unknown ppd             Historical Information         Medical History   History reviewed  No pertinent past medical history  Surgical History   History reviewed  No pertinent surgical history  Social History         Social History          Substance and Sexual Activity   Alcohol Use No      Social History          Substance and Sexual Activity   Drug Use No      Social History           Tobacco Use   Smoking Status Current Every Day Smoker    Packs/day: 1 50    Types: Cigarettes   Smokeless Tobacco Never Used      Family History: History reviewed  No pertinent family history      Meds/Allergies   all medications and allergies reviewed  No Known Allergies     Objective   Vitals: Blood pressure 117/73, pulse 78, temperature 98 3 °F (36 8 °C), temperature source Tympanic, resp   rate (!) 23, weight 85 2 kg (187 lb 13 3 oz), SpO2 98 %      No intake or output data in the 24 hours ending 07/13/22 0946          Invasive Devices  Report             Peripheral Intravenous Line  Duration                     Peripheral IV 07/13/22 Left Forearm <1 day      Peripheral IV 07/13/22 Right Antecubital <1 day                     Review of Systems:  Review of Systems   Constitutional: Negative for activity change, chills, diaphoresis, fatigue and fever  HENT: Negative for congestion, rhinorrhea, sinus pressure and sinus pain  Respiratory: Negative for apnea, cough, shortness of breath and stridor  Cardiovascular: positive for chest pain, palpitations and leg swelling  Gastrointestinal: Negative for abdominal distention, abdominal pain, blood in stool, constipation and diarrhea  Endocrine: Negative for cold intolerance, heat intolerance and polyuria  Genitourinary: Negative for difficulty urinating  Musculoskeletal: Negative for arthralgias, back pain, joint swelling and myalgias  Skin: Negative for color change, pallor, rash and wound  Neurological: Negative for dizziness, seizures, syncope, light-headedness, numbness and headaches  Psychiatric/Behavioral: Negative for agitation and hallucinations  The patient is not nervous/anxious and is not hyperactive           Physical Exam  Constitutional:       General: He is not in acute distress  Appearance: He is not diaphoretic  HENT:      Head: Normocephalic and atraumatic  Nose: Nose normal       Mouth/Throat:      Pharynx: No oropharyngeal exudate  Eyes:      General: No scleral icterus  Conjunctiva/sclera: Conjunctivae normal    Neck:      Thyroid: No thyromegaly  Vascular: No JVD  Trachea: No tracheal deviation  Cardiovascular:      Rate and Rhythm: Normal rate  Heart sounds: Normal heart sounds  No murmur heard  No friction rub  No gallop  Pulmonary:      Effort: Pulmonary effort is normal  No respiratory distress  Breath sounds: Normal breath sounds  No stridor  No wheezing or rales  Chest:      Chest wall: No tenderness  Abdominal:      General: Bowel sounds are normal  There is no distension  Palpations: Abdomen is soft  There is no mass  Tenderness: There is no abdominal tenderness  There is no guarding  Musculoskeletal:      Cervical back: Neck supple  Skin:     Coloration: Skin is not pale  Findings: No erythema or rash           GEN: Meche Robles appears well, alert and oriented x 3, pleasant and cooperative   HEENT:  Normocephalic, atraumatic, anicteric, moist mucous membranes  NECK: no JVD, no carotid bruits   HEART: regulsr rhythm, normal rate, normal S1 and S2, no murmurs, clicks, gallops or rubs   LUNGS: Clear to auscultation bilaterally; no wheezes, rales, or rhonchi; respiration nonlabored   ABDOMEN:  Normoactive bowel sounds, soft, no tenderness, no distention  EXTREMITIES: peripheral pulses palpable; no edema  NEURO: no gross focal findings; cranial nerves grossly intact   SKIN:  Dry, intact, warm to touch           Lab Results: I have personally reviewed pertinent lab results  No results found for: NA, K, CO2, CL, BUN, CREATININE, GLUCOSE, CALCIUM, MAGNESIUM, ALT, AST        Lab Results   Component Value Date     WBC 7 81 07/13/2022     HGB 15 8 07/13/2022     HCT 46 8 07/13/2022     MCV 91 07/13/2022      07/13/2022                Imaging: I have personally reviewed pertinent reports           EKG:   Date: 7/13/22                   Previous Cath/PCI:  No results found for this or any previous visit      No results found for this or any previous visit      No results found for this or any previous visit         Previous STRESS TEST:  No results found for this or any previous visit       No results found for this or any previous visit      No results found for this or any previous visit         ECHO:  No results found for this or any previous visit      No results found for this or any previous visit         [place]:  No results found for this or any previous visit      No results found for this or any previous visit         CMR:  No results found for this or any previous visit      No results found for this or any previous visit      No results found for this or any previous visit            MAC LAB Telemetry: not available     Code Status: will ask        Assessment/Plan      Assessment:     1  STEMI    A1c: pending  LDL: 154     Lab Results   Component Value Date    HSTNI0 485 (H) 07/13/2022        Plan:  1  Plan for cardiac cat at [facility] today  2  Received Brilinta 180 mg x1 then Brilinta 90 mg BID  Will also start Statin and BB as tolerated  3  Follow up on echo  4  Ace-I or ARB if tolerated before discharge  5  Follow up on A1c and troponin trend  6  Aggressive risk factor modification and smoking cessation  7  Will schedule outpatient cardiology follow up  Also needs PCP              Case discussed and reviewed with Dr Pako Salmeron who agrees with my assessment and plan         Rissa Byrne, DO  Cardiology Fellow PGY-4     ==========================================================================================        Epic/ Allscripts/Care Everywhere records reviewed: yes     ** Please Note: Fluency DirectDictation voice to text software may have been used in the creation of this document   **

## 2022-07-13 NOTE — PROGRESS NOTES
Cardiology Progress Note - Demian Nazario 65 y o  male MRN: [id number]    Unit/Bed#: [facility] 422-01 Encounter: [id number]      Assessment:  Principal Problem:    STEMI involving left anterior descending coronary artery [facility]    STEMI s/p [facility], PCI to mid LAD  Presenting with chest pain, no prior cardiac history, has not been following up with a PCP  [facility] on 7/13: LAD Prox 40%, mid 100%, L cx (LPAV artery 90%), LPDA 90%, normal RCA  S/P PCI to mid LAD  Unclear if he has DM, random blood sugar elevated on admission   (?non fasting)  Smoker >15 pack years  Family hx: father had an MI in his 52's      Plan:  1  Continue DAPT with aspirin and Brilinta, will need min 1 year followed by aspirin monotherapy  2  BB: lopressor 25 mg bid  3  High dose statin: atorvastatin 80 mg daily  4  Currently on IV fluids post CABG  5  Cardiac rehab  6  Staged PCI in 4-6 weeks vs medical management  7  Discussed about smoking cessation in detail    Subjective:   Patient seen and examined  No significant events overnight   negative, ; pertinent negatives - chest pain, chest pressure/discomfort, dyspnea, fatigue, orthopnea and palpitations  Objective:     Vitals: Blood pressure 118/75, pulse 77, temperature (!) 97 °F (36 1 °C), temperature source Oral, height 5' 6" (1 676 m), weight 84 8 kg (187 lb), SpO2 97 %  , Body mass index is 30 18 kg/m² ,   Orthostatic Blood Pressures    Flowsheet Row Most Recent Value   Blood Pressure 118/75 filed at 07/13/2022 1158            Intake/Output Summary (Last 24 hours) at 7/13/2022 1439  Last data filed at 7/13/2022 1121  Gross per 24 hour   Intake --   Output 0 ml   Net 0 ml       No significant arrhythmias seen on telemetry review         Physical Exam:    GEN: Demian Nazario appears well, alert and oriented x 3, pleasant and cooperative   HEENT: pupils equal, round, and reactive to light; extraocular muscles intact  NECK: supple, no carotid bruits   HEART: regular rhythm, normal S1 and S2, no murmurs, clicks, gallops or rubs   LUNGS: clear to auscultation bilaterally; no wheezes, rales, or rhonchi   ABDOMEN: normal bowel sounds, soft, no tenderness, no distention  EXTREMITIES: peripheral pulses normal; no clubbing, cyanosis, or edema  NEURO: no focal findings   SKIN: normal without suspicious lesions on exposed skin, radial access site shows no bleeding    Medications:      Current Facility-Administered Medications:     acetaminophen (TYLENOL) tablet 650 mg, 650 mg, Oral, Q4H PRN, [place] [employer], CRNP    [START ON 7/14/2022] aspirin (ECOTRIN LOW STRENGTH) EC tablet 81 mg, 81 mg, Oral, Daily, Tierney Grecsek, CRNP    atorvastatin (LIPITOR) tablet 80 mg, 80 mg, Oral, Daily With Dinner, [place] [employer], CRNP    heparin (porcine) subcutaneous injection 5,000 Units, 5,000 Units, Subcutaneous, Q8H [address] **AND** Platelet count, , , Once, [place] [employer], CRNP    metoprolol tartrate (LOPRESSOR) tablet 25 mg, 25 mg, Oral, Q12H [address], Tierney Chriscsek, CRNP    nicotine (NICODERM CQ) 14 mg/24hr TD 24 hr patch 1 patch, 1 patch, Transdermal, Daily, Tierney Rah, LIZETTENP, 1 patch at 07/13/22 1204    nitroglycerin (NITROSTAT) SL tablet 0 4 mg, 0 4 mg, Sublingual, Q5 Min PRN, [place] [employer], CRNP    ondansetron (ZOFRAN) injection 4 mg, 4 mg, Intravenous, Q6H PRN, [place] [employer], CRNP    sodium chloride 0 9 % infusion, 100 mL/hr, Intravenous, Continuous, Tierney Chriscsek, CRNP, Last Rate: 100 mL/hr at 07/13/22 1202, 100 mL/hr at 07/13/22 1202    ticagrelor (BRILINTA) tablet 90 mg, 90 mg, Oral, BID, [place] [employer], CRNP     Labs & Results:        Results from last 7 days   Lab Units 07/13/22  0932   WBC Thousand/uL 7 81   HEMOGLOBIN g/dL 15 8   HEMATOCRIT % 46 8   PLATELETS Thousands/uL 304     Results from last 7 days   Lab Units 07/13/22  0932   TRIGLYCERIDES mg/dL 114   HDL mg/dL 46     Results from last 7 days   Lab Units 07/13/22  0932   POTASSIUM mmol/L 3 6   CHLORIDE mmol/L 102   CO2 mmol/L 22   BUN mg/dL 11   CREATININE mg/dL 1 20   CALCIUM mg/dL 9 0   ALK PHOS U/L 104   ALT U/L 84*   AST U/L 35     Results from last 7 days   Lab Units 07/13/22  0932   INR  0 96   PTT seconds 27     Results from last 7 days   Lab Units 07/13/22  0932   MAGNESIUM mg/dL 1 8           EKG personally reviewed by Monique Christian MD

## 2022-07-14 LAB
ANION GAP SERPL CALCULATED.3IONS-SCNC: 5 MMOL/L (ref 4–13)
BUN SERPL-MCNC: 13 MG/DL (ref 5–25)
CALCIUM SERPL-MCNC: 8.8 MG/DL (ref 8.3–10.1)
CHLORIDE SERPL-SCNC: 107 MMOL/L (ref 100–108)
CHOLEST SERPL-MCNC: 194 MG/DL
CO2 SERPL-SCNC: 26 MMOL/L (ref 21–32)
CREAT SERPL-MCNC: 1.03 MG/DL (ref 0.6–1.3)
ERYTHROCYTE [DISTWIDTH] IN BLOOD BY AUTOMATED COUNT: 12.9 % (ref 11.6–15.1)
EST. AVERAGE GLUCOSE BLD GHB EST-MCNC: 117 MG/DL
EST. AVERAGE GLUCOSE BLD GHB EST-MCNC: 117 MG/DL
GFR SERPL CREATININE-BSD FRML MDRD: 80 ML/MIN/1.73SQ M
GLUCOSE SERPL-MCNC: 113 MG/DL (ref 65–140)
HBA1C MFR BLD: 5.7 %
HBA1C MFR BLD: 5.7 %
HCT VFR BLD AUTO: 47.7 % (ref 36.5–49.3)
HDLC SERPL-MCNC: 43 MG/DL
HGB BLD-MCNC: 15.6 G/DL (ref 12–17)
LDLC SERPL CALC-MCNC: 124 MG/DL (ref 0–100)
MCH RBC QN AUTO: 31.3 PG (ref 26.8–34.3)
MCHC RBC AUTO-ENTMCNC: 32.7 G/DL (ref 31.4–37.4)
MCV RBC AUTO: 96 FL (ref 82–98)
NONHDLC SERPL-MCNC: 151 MG/DL
PLATELET # BLD AUTO: 264 THOUSANDS/UL (ref 149–390)
PMV BLD AUTO: 8.6 FL (ref 8.9–12.7)
POTASSIUM SERPL-SCNC: 4 MMOL/L (ref 3.5–5.3)
RBC # BLD AUTO: 4.98 MILLION/UL (ref 3.88–5.62)
SODIUM SERPL-SCNC: 138 MMOL/L (ref 136–145)
TRIGL SERPL-MCNC: 136 MG/DL
WBC # BLD AUTO: 9.28 THOUSAND/UL (ref 4.31–10.16)

## 2022-07-14 PROCEDURE — 99232 SBSQ HOSP IP/OBS MODERATE 35: CPT | Performed by: INTERNAL MEDICINE

## 2022-07-14 PROCEDURE — 80061 LIPID PANEL: CPT

## 2022-07-14 PROCEDURE — 80048 BASIC METABOLIC PNL TOTAL CA: CPT

## 2022-07-14 PROCEDURE — 85027 COMPLETE CBC AUTOMATED: CPT

## 2022-07-14 RX ORDER — METOPROLOL TARTRATE 50 MG/1
50 TABLET, FILM COATED ORAL EVERY 12 HOURS SCHEDULED
Status: DISCONTINUED | OUTPATIENT
Start: 2022-07-14 | End: 2022-07-15 | Stop reason: HOSPADM

## 2022-07-14 RX ADMIN — NICOTINE 1 PATCH: 14 PATCH, EXTENDED RELEASE TRANSDERMAL at 09:37

## 2022-07-14 RX ADMIN — ASPIRIN 81 MG: 81 TABLET, COATED ORAL at 09:37

## 2022-07-14 RX ADMIN — HEPARIN SODIUM 5000 UNITS: 5000 INJECTION INTRAVENOUS; SUBCUTANEOUS at 21:48

## 2022-07-14 RX ADMIN — TICAGRELOR 90 MG: 90 TABLET ORAL at 09:37

## 2022-07-14 RX ADMIN — METOPROLOL TARTRATE 25 MG: 50 TABLET, FILM COATED ORAL at 09:37

## 2022-07-14 RX ADMIN — HEPARIN SODIUM 5000 UNITS: 5000 INJECTION INTRAVENOUS; SUBCUTANEOUS at 05:06

## 2022-07-14 RX ADMIN — METOPROLOL TARTRATE 50 MG: 50 TABLET, FILM COATED ORAL at 21:48

## 2022-07-14 RX ADMIN — ATORVASTATIN CALCIUM 80 MG: 80 TABLET, FILM COATED ORAL at 17:07

## 2022-07-14 RX ADMIN — HEPARIN SODIUM 5000 UNITS: 5000 INJECTION INTRAVENOUS; SUBCUTANEOUS at 14:35

## 2022-07-14 RX ADMIN — TICAGRELOR 90 MG: 90 TABLET ORAL at 17:07

## 2022-07-14 NOTE — UTILIZATION REVIEW
Initial Clinical Review    Admission: Date/Time/Statement:   Admission Orders (From admission, onward)     Ordered        07/13/22 1039  Inpatient Admission  Once                      Orders Placed This Encounter   Procedures    Inpatient Admission     Standing Status:   Standing     Number of Occurrences:   1     Order Specific Question:   Level of Care     Answer:   Level 1 Stepdown [13]     Order Specific Question:   Estimated length of stay     Answer:   More than 2 Midnights     Order Specific Question:   Certification     Answer:   I certify that inpatient services are medically necessary for this patient for a duration of greater than two midnights  See H&P and MD Progress Notes for additional information about the patient's course of treatment  Initial Presentation: 65 y o  male with no significant PMHx except (+) tobacco use presents to [facility] as a transfer from [address] where he intinitially presented with c/o chest pain  Pt found to have an interior STEMI based on EKG and tx'd to [facility] for cardiac cath and further treatment  ADMIT INPATIENT to [facility] 1 STEP DOWN with STEMI -- on arrival to [facility] pt AA&Ox3, has cp 3/10 which is better than yesterday  Anxious and diaphoretic  Plan: telel monitoring  Trend troponin  Npo for cardiac cath  Received Brilinta 180 mg x1 then Brilinta 90 mg BID  Start Statin and BB as tolerated  ACEi or ARF if siddharth before d/c  A1c, lipid panel  Echo pending  7/13 cardiology pm note: s/p [facility] with PCI to mid LAD  Continue DAPT with aspirin and Brilinta, will need min 1 year followed by aspirin monotherapy  Lopressor 25 mg bid, atorvastatin 80 mg daily  Cardiac rehab after d/c  Staged PCI in 4-6 weeks vs medical management  Discussed about smoking cessation in detail       Date: 7/14   Day 2: Pt denies complaints today   Continue dual antiplatelet therapy and beta-blocker, will up titrate beta-blocker   Echo with ejection fraction of 50%   Supportive care    Wt Readings from Last 1 Encounters:   07/14/22 82 8 kg (182 lb 8 7 oz)     Vital Signs:   Date/Time Temp Pulse Resp BP MAP (mmHg) SpO2 O2 Device   07/14/22 07:15:59 97 9 °F (36 6 °C) -- 18 133/86 102 97 % --   07/14/22 0300 98 2 °F (36 8 °C) 89 16 125/86 -- -- --   07/13/22 2359 -- 93 -- 128/72 -- -- --   07/13/22 21:40:19 98 8 °F (37 1 °C) -- 18 161/103 Abnormal  122 -- --   07/13/22 2131 -- 96 -- -- -- -- --   07/13/22 2000 -- -- -- -- -- -- None (Room air)   07/13/22 18:37:12 98 °F (36 7 °C) -- -- 158/101 Abnormal  120 -- --   07/13/22 16:26:16 98 1 °F (36 7 °C) -- 20 160/102 Abnormal  121 -- --   07/13/22 11:58:18 97 °F (36 1 °C) Abnormal  77 -- 118/75 89 -- --   07/13/22 1129 -- 76 -- 116/69 -- 97 % None (Room air)       Pertinent Labs/Diagnostic Test Results:   CXR 7/13: Clear lungs       EKG 7/13: Normal sinus rhythm  Inferior infarct , age undetermined  Lateral injury pattern  ACUTE MI / STEMI   Abnormal ECG  No previous ECGs     EKG 7/13: Normal sinus rhythm  Rightward axis  Anteroseptal infarct , possibly acute  Lateral injury pattern  ACUTE MI / STEMI  Abnormal ECG    LHC on 7/13: LAD Prox 40%, mid 100%, L cx (LPAV artery 90%), LPDA 90%, normal RCA      Results from last 7 days   Lab Units 07/14/22  0455 07/13/22  0932   WBC Thousand/uL 9 28 7 81   HEMOGLOBIN g/dL 15 6 15 8   HEMATOCRIT % 47 7 46 8   PLATELETS Thousands/uL 264 304   NEUTROS ABS Thousands/µL  --  4 99     Results from last 7 days   Lab Units 07/14/22  0455 07/13/22  0932   SODIUM mmol/L 138 136   POTASSIUM mmol/L 4 0 3 6   CHLORIDE mmol/L 107 102   CO2 mmol/L 26 22   ANION GAP mmol/L 5 12   BUN mg/dL 13 11   CREATININE mg/dL 1 03 1 20   EGFR ml/min/1 73sq m 80 67   CALCIUM mg/dL 8 8 9 0   MAGNESIUM mg/dL  --  1 8     Results from last 7 days   Lab Units 07/13/22  0932   AST U/L 35   ALT U/L 84*   ALK PHOS U/L 104   TOTAL PROTEIN g/dL 7 0   ALBUMIN g/dL 3 7   TOTAL BILIRUBIN mg/dL 0 65     Results from last 7 days   Lab Units 07/13/22  0929   POC GLUCOSE mg/dl 170*     Results from last 7 days   Lab Units 07/14/22  0455 07/13/22  0932   GLUCOSE RANDOM mg/dL 113 171*     Results from last 7 days   Lab Units 07/13/22  1205 07/13/22  0932   HEMOGLOBIN A1C % 5 7* 5 7*   EAG mg/dl 117 117     Results from last 7 days   Lab Units 07/13/22  1205 07/13/22  0932   HS TNI 0HR ng/L >22,973* 485*     Results from last 7 days   Lab Units 07/13/22  0932   PROTIME seconds 12 3   INR  0 96   PTT seconds 27     Results from last 7 days   Lab Units 07/13/22  0932   NT-PRO BNP pg/mL 864*     No past medical history on file  Present on Admission:   STEMI involving left anterior descending coronary artery [facility])      Admitting Diagnosis: STEMI (ST elevation myocardial infarction) ([facility] [address] ) [I21 3]  Age/Sex: 65 y o  male  Admission Orders:  Scheduled Medications:  aspirin, 81 mg, Oral, Daily  atorvastatin, 80 mg, Oral, Daily With Dinner  heparin (porcine), 5,000 Units, Subcutaneous, Q8H [address]  metoprolol tartrate, 25 mg, Oral, Q12H MARIE  nicotine, 1 patch, Transdermal, Daily  ticagrelor, 90 mg, Oral, BID     PRN Meds:  acetaminophen, 650 mg, Oral, Q4H PRN  nitroglycerin, 0 4 mg, Sublingual, Q5 Min PRN  ondansetron, 4 mg, Intravenous, Q6H PRN      Network Utilization Review Department  ATTENTION: Please call with any questions or concerns to [phone] and carefully listen to the prompts so that you are directed to the right person  All voicemails are confidential   Sue Perkins all requests for admission clinical reviews, approved or denied determinations and any other requests to dedicated fax number below belonging to the campus where the patient is receiving treatment   List of dedicated fax numbers for the Facilities:  [address] [place] [address] DENIALS (Administrative/Medical Necessity) [phone]   [address] (Maternity/NICU/Pediatrics) 261 [facility],[address] Floor [place] [address] [phone]   Steve Leonard [address] [facility] [address] [place] [address] [address] [place] [address] Johana Garcia [address] [place] [address] [phone]

## 2022-07-14 NOTE — PROGRESS NOTES
Progress Note - Cardiology   Michael Jimenez 65 y o  male MRN: [id number]  Unit/Bed#: [facility] 422-01 Encounter: [id number]  07/14/22  2:42 PM    Impression and Plan:      60-year-old with likely undiagnosed diabetes, ongoing tobacco use, likely no hypertension, no routine medical care, presented with chest pains that started the night before but by the morning, had resolved, went to work and had recurrence and brought himself to the ER, had ST elevations in the anterolateral leads with coronary angiography showing a mid LAD occlusion that was intervened with drug-eluting stent as well as a 90% left posterolateral branch occlusion for which staged PCI is being considered        He is not symptomatic at this time  Hemodynamically stable  No chest pains        Plan:       Coronary artery disease/anterolateral STEMI involving the LAD, status post PCI:    Continue dual antiplatelet therapy and beta-blocker, will up titrate beta-blocker  Surprisingly a Echo with ejection fraction of 50%  Normal exam        Likely diabetes:  A1c of only 5 7  Continue high-intensity statin       Tobacco cessation was advised       Will keep him in the hospital till 7/15/2022       Works a physical job at [employer], plan for him to return to work at [employer] duty in about 2 weeks  Follow-up in the office in 1 week and start cardiac rehab soon after        ===================================================================    Chief Complaint: No chief complaint on file          Subjective/Objective     Subjective:  Denies any complaints    Objective:  No distress    Patient Active Problem List   Diagnosis    STEMI involving left anterior descending coronary artery (HCC)       Vitals: /85   Pulse 89   Temp 98 °F (36 7 °C)   Resp 18   Ht 5' 6" (1 676 m)   Wt 82 8 kg (182 lb 8 7 oz)   SpO2 94%   BMI 29 46 kg/m²     I/O this shift:  In: 120 [P O :120]  Out: -   Wt Readings from Last 3 Encounters:   07/14/22 82 8 kg (182 lb 8 7 oz) 07/13/22 85 2 kg (187 lb 13 3 oz)   08/22/16 77 1 kg (170 lb)       Intake/Output Summary (Last 24 hours) at 7/14/2022 1442  Last data filed at 7/14/2022 1115  Gross per 24 hour   Intake 740 ml   Output 2400 ml   Net -1660 ml     I/O last 3 completed shifts: In: 620 [I V :620]  Out: 2400 [Urine:2400]    Invasive Devices  Report    Peripheral Intravenous Line  Duration           Peripheral IV 07/13/22 Left Forearm 1 day                  Physical Exam:  GEN: Saad Webber appears well, alert and oriented x 3, pleasant and cooperative   HEENT: pupils equal, round, and reactive to light; extraocular muscles intact  NECK: supple, no carotid bruits or JVD  HEART: regular rhythm, normal S1 and S2, no murmur, no clicks, gallops or rubs   LUNGS: clear to auscultation bilaterally; no wheezes or rhonchi, no rales  ABDOMEN/GI: normal bowel sounds, soft, no tenderness, no distention  EXTREMITIES/Musculoskeltal: peripheral pulses normal; no clubbing, cyanosis, no edema  NEURO: no focal motor findings   SKIN: normal without suspicious lesions on exposed skin              Lab Results:       Results from last 7 days   Lab Units 07/14/22  0455 07/13/22  0932   WBC Thousand/uL 9 28 7 81   HEMOGLOBIN g/dL 15 6 15 8   HEMATOCRIT % 47 7 46 8   PLATELETS Thousands/uL 264 304         Results from last 7 days   Lab Units 07/14/22  0455 07/13/22  0932   POTASSIUM mmol/L 4 0 3 6   CHLORIDE mmol/L 107 102   CO2 mmol/L 26 22   BUN mg/dL 13 11   CREATININE mg/dL 1 03 1 20   CALCIUM mg/dL 8 8 9 0   ALK PHOS U/L  --  104   ALT U/L  --  84*   AST U/L  --  35     Results from last 7 days   Lab Units 07/13/22  0932   INR  0 96       Imaging: I have personally reviewed pertinent reports      EKG/Telemtry:  No events except for short burst of nonsustained VT-reperfusion arrhythmia    Scheduled Meds:  Current Facility-Administered Medications   Medication Dose Route Frequency Provider Last Rate    acetaminophen  650 mg Oral Q4H PRN Tierney KELVIN Quesada      aspirin  81 mg Oral Daily [place] [employer], CRNP      atorvastatin  80 mg Oral Daily With [employer], CRNP      heparin (porcine)  5,000 Units Subcutaneous [facility] [place] [employer], CRNP      metoprolol tartrate  50 mg Oral Q12H [address] Cely Rios MD      nicotine  1 patch Transdermal Daily [place] [employer], CRNP      nitroglycerin  0 4 mg Sublingual Q5 Min PRN [place] [employer], CRNP      ondansetron  4 mg Intravenous Q6H PRN [place] [employer], CRNP      ticagrelor  90 mg Oral BID [place] [employer], CRNP       Continuous Infusions:       VTE Pharmacologic Prophylaxis: Heparin  VTE Mechanical Prophylaxis: sequential compression device    This note was completed in part utilizing m-modal fluency direct voice recognition software  Grammatical errors, random word insertion, spelling mistakes, occasional wrong word or "sound-alike" substitutions and incomplete sentences may be an occasional consequence of the system secondary to software limitations, ambient noise and hardware issues  At the time of dictation, efforts were made to edit, clarify and /or correct errors  Please read the chart carefully and recognize, using context, where substitutions have occurred  If you have any questions or concerns about the context, text or information contained within the body of this dictation, please contact myself, the provider, for further clarification

## 2022-07-15 VITALS
HEART RATE: 97 BPM | HEIGHT: 66 IN | SYSTOLIC BLOOD PRESSURE: 126 MMHG | TEMPERATURE: 98.3 F | WEIGHT: 181.66 LBS | RESPIRATION RATE: 19 BRPM | OXYGEN SATURATION: 98 % | DIASTOLIC BLOOD PRESSURE: 85 MMHG | BODY MASS INDEX: 29.2 KG/M2

## 2022-07-15 PROCEDURE — 99238 HOSP IP/OBS DSCHRG MGMT 30/<: CPT | Performed by: INTERNAL MEDICINE

## 2022-07-15 RX ORDER — METOPROLOL SUCCINATE 50 MG/1
50 TABLET, EXTENDED RELEASE ORAL DAILY
Qty: 30 TABLET | Refills: 0 | OUTPATIENT
Start: 2022-07-15 | End: 2022-08-14

## 2022-07-15 RX ORDER — NITROGLYCERIN 0.4 MG/1
0.4 TABLET SUBLINGUAL
Qty: 15 TABLET | Refills: 0 | OUTPATIENT
Start: 2022-07-15

## 2022-07-15 RX ORDER — ATORVASTATIN CALCIUM 80 MG/1
80 TABLET, FILM COATED ORAL
Qty: 30 TABLET | Refills: 0 | Status: SHIPPED | OUTPATIENT
Start: 2022-07-15 | End: 2022-07-19 | Stop reason: SDUPTHER

## 2022-07-15 RX ORDER — METOPROLOL SUCCINATE 50 MG/1
50 TABLET, EXTENDED RELEASE ORAL 2 TIMES DAILY
Qty: 60 TABLET | Refills: 0 | Status: SHIPPED | OUTPATIENT
Start: 2022-07-15 | End: 2022-07-19 | Stop reason: SDUPTHER

## 2022-07-15 RX ORDER — ASPIRIN 81 MG/1
81 TABLET ORAL DAILY
Qty: 30 TABLET | Refills: 0 | Status: SHIPPED | OUTPATIENT
Start: 2022-07-16 | End: 2022-08-31

## 2022-07-15 RX ORDER — ASPIRIN 81 MG/1
81 TABLET ORAL DAILY
Qty: 30 TABLET | Refills: 0 | OUTPATIENT
Start: 2022-07-16 | End: 2022-08-15

## 2022-07-15 RX ORDER — NITROGLYCERIN 0.4 MG/1
0.4 TABLET SUBLINGUAL
Qty: 15 TABLET | Refills: 0 | Status: SHIPPED | OUTPATIENT
Start: 2022-07-15

## 2022-07-15 RX ORDER — ATORVASTATIN CALCIUM 80 MG/1
80 TABLET, FILM COATED ORAL
Qty: 30 TABLET | Refills: 0 | OUTPATIENT
Start: 2022-07-15 | End: 2022-08-14

## 2022-07-15 RX ADMIN — ASPIRIN 81 MG: 81 TABLET, COATED ORAL at 10:06

## 2022-07-15 RX ADMIN — HEPARIN SODIUM 5000 UNITS: 5000 INJECTION INTRAVENOUS; SUBCUTANEOUS at 06:18

## 2022-07-15 RX ADMIN — NICOTINE 1 PATCH: 14 PATCH, EXTENDED RELEASE TRANSDERMAL at 10:06

## 2022-07-15 RX ADMIN — HEPARIN SODIUM 5000 UNITS: 5000 INJECTION INTRAVENOUS; SUBCUTANEOUS at 13:26

## 2022-07-15 RX ADMIN — TICAGRELOR 90 MG: 90 TABLET ORAL at 16:45

## 2022-07-15 RX ADMIN — METOPROLOL TARTRATE 50 MG: 50 TABLET, FILM COATED ORAL at 10:06

## 2022-07-15 RX ADMIN — ATORVASTATIN CALCIUM 80 MG: 80 TABLET, FILM COATED ORAL at 16:44

## 2022-07-15 RX ADMIN — TICAGRELOR 90 MG: 90 TABLET ORAL at 10:06

## 2022-07-15 NOTE — ASSESSMENT & PLAN NOTE
· Presented with chest pain, no prior cardiac history, has not been following up with a PCP  · [facility] on 7/13: LAD Prox 40%, mid 100%, L cx (LPAV artery 90%), LPDA 90%, normal RCA  · S/P PCI to mid LAD  · H1C: 5 7  ·  (?non fasting)  · Smoker >15 pack years  · Family hx: father had an MI in his 52's    Plan  1  Continue aspirin 81 mg daily  2  Continue Brilinta 90 mg b i d  (price checked $20 00)  3  Continue high-dose atorvastatin 80 mg daily  4  Will transition Lopressor to Toprol XL 50 mg b i d   5  Follow-up office visit in 1 week, possible staged PCI in 4-6 weeks  6  Cardiac rehabilitation following office visit  7   Return to work at [employer] duty in about 2 weeks    8  Advised on Smoking cessation

## 2022-07-15 NOTE — QUICK NOTE
[facility]  [facility] and [facility] of [facility]s from [facility]

## 2022-07-15 NOTE — ASSESSMENT & PLAN NOTE
· Presented with chest pain, no prior cardiac history, has not been following up with a PCP  · [facility] on 7/13: LAD Prox 40%, mid 100%, L cx (LPAV artery 90%), LPDA 90%, normal RCA  · S/P PCI to mid LAD  · H1C: 5 7  ·  (?non fasting)  · Smoker >15 pack years  · Family hx: father had an MI in his 52's    Plan  1  Continue aspirin 81 mg daily  2  Continue Brilinta 90 mg b i d   3  Continue high-dose atorvastatin 80 mg daily  4  Will transition Lopressor to Toprol XL 50 mg b i d   5  Follow-up office visit in 1 week, possible staged PCI in 4-6 weeks  6  Cardiac rehabilitation following office visit  7   Return to work at [employer] duty in about 2 weeks    8  Advised on Smoking cessation

## 2022-07-15 NOTE — DISCHARGE SUMMARY
[address] [facility]  Discharge- Chito Hall 1965, 65 y o  male MRN: [id number]  Unit/Bed#: [facility] 422-01 Encounter: [id number]  Primary Care Provider: No primary care provider on file  Date and time admitted to hospital: 7/13/2022 10:28 AM    * STEMI involving left anterior descending coronary artery [facility])  Assessment & Plan  · Presented with chest pain, no prior cardiac history, has not been following up with a PCP  · [facility] on 7/13: LAD Prox 40%, mid 100%, L cx (LPAV artery 90%), LPDA 90%, normal RCA  · S/P PCI to mid LAD  · H1C: 5 7  ·  (?non fasting)  · Smoker >15 pack years  · Family hx: father had an MI in his 52's    Plan  1  Continue aspirin 81 mg daily  2  Continue Brilinta 90 mg b i d  (price checked $20 00)  3  Continue high-dose atorvastatin 80 mg daily  4  Will transition Lopressor to Toprol XL 50 mg b i d   5  Follow-up office visit in 1 week, possible staged PCI in 4-6 weeks  6  Cardiac rehabilitation following office visit  7  Return to work at [employer] duty in about 2 weeks    8  Advised on Smoking cessation           Medical Problems             Resolved Problems  Date Reviewed: 7/13/2022   None                 Admission Date:   Admission Orders (From admission, onward)     Ordered        07/13/22 1039  Inpatient Admission  Once                        Admitting Diagnosis: STEMI (ST elevation myocardial infarction) ([facility] [address] ) [I21 3]    HPI: 60-year-old with likely undiagnosed diabetes, ongoing tobacco use, likely no hypertension, no routine medical care, presented with chest pains that started the night before but by the morning, had resolved, went to work and had recurrence and brought himself to the ER, had ST elevations in the anterolateral leads  Procedures Performed:   Orders Placed This Encounter   Procedures    Cardiac catheterization       Summary of Hospital Course:   On presentation, he was taken emergently to cath lab, with coronary angiography showing a mid LAD occlusion that was intervened with drug-eluting stent as well as a 90% left posterolateral branch occlusion for which staged PCI is being considered  Postprocedure was uneventful except for 1 episode of nonsustained V-tach  He was started on beta-blocker (metoprolol tartrate 50 mg b i d ), in addition to continuation of his dual antiplatelet aspirin and Brilinta  He will be discharged on dual antiplatelets, high-dose statin, Toprol XL 50 b i d , and advised on smoking cessation  He will be following up with Cardiology in 1 week followed by cardiac rehab  Will be discussing the need for stage PCI during his office visit    Physical Exam  Vitals and nursing note reviewed  Constitutional:       General: He is not in acute distress  Appearance: He is not ill-appearing  HENT:      Head: Normocephalic  Nose: Nose normal       Mouth/Throat:      Mouth: Mucous membranes are moist    Eyes:      Pupils: Pupils are equal, round, and reactive to light  Cardiovascular:      Rate and Rhythm: Normal rate and regular rhythm  Pulses: Normal pulses  Heart sounds: No murmur heard  Pulmonary:      Effort: Pulmonary effort is normal       Breath sounds: No wheezing or rales  Abdominal:      General: Abdomen is flat  Musculoskeletal:      Right lower leg: No edema  Left lower leg: No edema  Skin:     General: Skin is warm  Capillary Refill: Capillary refill takes less than 2 seconds  Neurological:      General: No focal deficit present  Mental Status: He is oriented to person, place, and time  Psychiatric:         Mood and Affect: Mood normal        Condition at Discharge: stable         Discharge instructions/Information to patient and family:   See after visit summary for information provided to patient and family  Provisions for Follow-Up Care:  See after visit summary for information related to follow-up care and any pertinent home health orders        PCP: No primary care provider on file  Disposition: Home    Planned Readmission: No    Discharge Medications:  See after visit summary for reconciled discharge medications provided to patient and family

## 2022-07-18 ENCOUNTER — TELEPHONE (OUTPATIENT)
Dept: CARDIOLOGY CLINIC | Facility: CLINIC | Age: 57
End: 2022-07-18

## 2022-07-18 NOTE — UTILIZATION REVIEW
Notification of Discharge   This is a Notification of Discharge from our facility [address] [place] [address]  Please be advised that this patient has been discharge from our facility  Below you will find the admission and discharge date and time including the patients disposition  UTILIZATION REVIEW CONTACT:  Valorie Beaulieu  Utilization   Network Utilization Review Department  Phone: [phone] x carefully listen to the prompts  All voicemails are confidential   Email: [email or web address]     PHYSICIAN ADVISORY SERVICES:  FOR YBYX-BA-HDRS REVIEW - MEDICAL NECESSITY DENIAL  Phone: [phone]  Fax: [phone]  Email: [email or web address]     PRESENTATION DATE: 7/13/2022 10:28 AM  OBERVATION ADMISSION DATE:   INPATIENT ADMISSION DATE: 7/13/22 10:39 AM   DISCHARGE DATE: 7/15/2022  5:43 PM  DISPOSITION: Home/Self Care Home/Self Care      IMPORTANT INFORMATION:  Send all requests for admission clinical reviews, approved or denied determinations and any other requests to dedicated fax number below belonging to the campus where the patient is receiving treatment   List of dedicated fax numbers:  [address] DENIALS (Administrative/Medical Necessity) [phone]   [address]  (Maternity/NICU/Pediatrics) [phone]   Ramandeep Castro [phone]   [address] [facility] [address] [phone]   [address] [place] [address] [phone]   2000 [facility] [address] [place] [address],[address] [phone]   [facility]  [phone]   220[address] [facility]  24[address] [facility] 1000 [facility] [phone]

## 2022-07-19 ENCOUNTER — OFFICE VISIT (OUTPATIENT)
Dept: CARDIOLOGY CLINIC | Facility: CLINIC | Age: 57
End: 2022-07-19
Payer: COMMERCIAL

## 2022-07-19 VITALS
HEIGHT: 66 IN | WEIGHT: 182 LBS | SYSTOLIC BLOOD PRESSURE: 144 MMHG | DIASTOLIC BLOOD PRESSURE: 86 MMHG | HEART RATE: 80 BPM | BODY MASS INDEX: 29.25 KG/M2

## 2022-07-19 DIAGNOSIS — I21.3 STEMI (ST ELEVATION MYOCARDIAL INFARCTION) (HCC): ICD-10-CM

## 2022-07-19 DIAGNOSIS — Z72.0 TOBACCO ABUSE: ICD-10-CM

## 2022-07-19 DIAGNOSIS — I21.02 STEMI INVOLVING LEFT ANTERIOR DESCENDING CORONARY ARTERY (HCC): Primary | ICD-10-CM

## 2022-07-19 PROCEDURE — 99204 OFFICE O/P NEW MOD 45 MIN: CPT | Performed by: NURSE PRACTITIONER

## 2022-07-19 RX ORDER — ATORVASTATIN CALCIUM 80 MG/1
80 TABLET, FILM COATED ORAL
Qty: 30 TABLET | Refills: 11 | Status: SHIPPED | OUTPATIENT
Start: 2022-07-19 | End: 2022-08-25 | Stop reason: SDUPTHER

## 2022-07-19 RX ORDER — METOPROLOL SUCCINATE 50 MG/1
50 TABLET, EXTENDED RELEASE ORAL 2 TIMES DAILY
Qty: 60 TABLET | Refills: 11 | Status: SHIPPED | OUTPATIENT
Start: 2022-07-19 | End: 2022-08-25 | Stop reason: SDUPTHER

## 2022-07-19 NOTE — ASSESSMENT & PLAN NOTE
Patient continues to smoke, however has decreased from 3 packs per day to 6 cigarettes per day  Encouraged complete cessation

## 2022-07-19 NOTE — PROGRESS NOTES
Patient ID: Princess Navarrete is a 65 y o  male  Plan:      Tobacco abuse  Patient continues to smoke, however has decreased from 3 packs per day to 6 cigarettes per day  Encouraged complete cessation    STEMI involving left anterior descending coronary artery ([address] )  Status post BRIAN to mid LAD 7/13/2022, planning staged intervention to PDA  Continue DAPT (aspirin, Brilinta,) statin, beta-blocker  Follow up Plan/Summary Comments:  Continue current cardiac medications including DAPT, statin, beta-blocker  I saw a note that the cath team attempted to reach him to schedule his staged PCI  He does not recall getting a message so I provided him with the number to call to arrange his procedure  He is looking forward to starting cardiac rehab next week  We discussed lifestyle modification including complete smoking cessation, healthy diet, and increased activity  Follow-up in our office in 3 months  He will call sooner if needed  HPI:  I had the pleasure of meeting Clara Cervantes in the office today to establish ongoing cardiac care  Clara Cervantes was recently hospitalized following an STEMI  He presented to [facility] on 07/13/2022 with reports of chest pain  He was noted to have ST elevation on his EKG and was transferred to the [facility] ([place], [place])  He underwent cardiac catheterization with stenting to a 100% mid LAD lesion  Residual disease was noted and he is planning for a staged intervention on a 90% PDA lesion  Gerhardt Sep He denies any recurrent chest pain, pressure, tightness, burning  He notes ongoing exertional dyspnea, however notes this was present for a long time prior to his MI  It has been improving since his intervention  He denies any dizziness, lightheadedness, palpitations, syncope  Since his MI, he has reduced his smoking from 3 packs per day to 6 cigarettes a day and is working to further decrease  He is planning to begin cardiac rehab next week    He was told previously that he could return to work, light duty, on 08/15/2022  Review of Systems   10  point ROS  was otherwise non pertinent or negative except as per HPI or as below  Gait: Normal      Most recent or relevant cardiac/vascular testing:    Echo 07/13/2022 EF 50%, akinesis of the apical anterior, apical septal, apical inferior, apical lateral segments and apex  Cardiac catheterization 07/13/2022    · Mid LAD lesion is 100% stenosed  · LPAV lesion is 90% stenosed  · Prox LAD lesion is 40% stenosed  Severe 2-vessel coronary artery disease with culprit mid LAD lesion which was intervened on  Residual PDA 90% lesion in dominant LCX  Objective:     /86   Pulse 80   Ht 5' 6" (1 676 m)   Wt 82 6 kg (182 lb)   BMI 29 38 kg/m²     PHYSICAL EXAM:    General:  Normal appearance, no acute distress  Eyes:  Anicteric  Oral mucosa:  Moist   Neck:  No JVD  Carotid upstrokes are brisk without bruits  No masses  Chest:  Clear to auscultation   Cardiac:  No palpable PMI  Normal S1 and S2  No murmur gallop or rub  Abdomen:  Soft and nontender  No palpable organomegaly or aortic enlargement  Extremities:  No peripheral edema  Musculoskeletal:  Symmetric  Vascular:  Pedal pulses are intact  Neuro:  Grossly symmetric  Psych:  Alert and oriented x3      No Known Allergies    Current Outpatient Medications:     aspirin (ECOTRIN LOW STRENGTH) 81 mg EC tablet, Take 1 tablet (81 mg total) by mouth daily, Disp: 30 tablet, Rfl: 0    atorvastatin (LIPITOR) 80 mg tablet, Take 1 tablet (80 mg total) by mouth daily with dinner, Disp: 30 tablet, Rfl: 11    metoprolol succinate (TOPROL-XL) 50 mg 24 hr tablet, Take 1 tablet (50 mg total) by mouth 2 (two) times a day, Disp: 60 tablet, Rfl: 11    nitroglycerin (NITROSTAT) 0 4 mg SL tablet, Place 1 tablet (0 4 mg total) under the tongue every 5 (five) minutes as needed for chest pain, Disp: 15 tablet, Rfl: 0    ticagrelor (BRILINTA) 90 MG, Take 1 tablet (90 mg total) by mouth 2 (two) times a day, Disp: 60 tablet, Rfl: 11  Past Medical History:   Diagnosis Date    Coronary artery disease     anterolateral STEMI involving the LAD, status post PCI    STEMI (ST elevation myocardial infarction)      Past Surgical History:   Procedure Laterality Date    CARDIAC CATHETERIZATION N/A 7/13/2022    Procedure: Cardiac pci;  Surgeon: Joseph Servin MD;  [facility]; Service: Cardiology    CARDIAC CATHETERIZATION N/A 7/13/2022    Procedure: Cardiac Coronary Angiogram;  Surgeon: Joseph Servin MD;  [facility]; Service: Cardiology    CARDIAC CATHETERIZATION  7/13/2022    Procedure: Cardiac catheterization;  Surgeon: Joseph Servin MD;  [facility];   Service: Cardiology       CMP:   Lab Results   Component Value Date    K 4 0 07/14/2022     07/14/2022    CO2 26 07/14/2022    BUN 13 07/14/2022    CREATININE 1 03 07/14/2022    EGFR 80 07/14/2022     Lipid Profile:    Lab Results   Component Value Date    TRIG 136 07/14/2022    HDL 43 07/14/2022         Social History     Tobacco Use   Smoking Status Current Every Day Smoker    Packs/day: 0 50    Types: Cigarettes   Smokeless Tobacco Never Used

## 2022-07-19 NOTE — H&P (VIEW-ONLY)
Patient ID: John Lopez is a 65 y o  male  Plan:      Tobacco abuse  Patient continues to smoke, however has decreased from 3 packs per day to 6 cigarettes per day  Encouraged complete cessation    STEMI involving left anterior descending coronary artery ([address] )  Status post BRIAN to mid LAD 7/13/2022, planning staged intervention to PDA  Continue DAPT (aspirin, Brilinta,) statin, beta-blocker  Follow up Plan/Summary Comments:  Continue current cardiac medications including DAPT, statin, beta-blocker  I saw a note that the cath team attempted to reach him to schedule his staged PCI  He does not recall getting a message so I provided him with the number to call to arrange his procedure  He is looking forward to starting cardiac rehab next week  We discussed lifestyle modification including complete smoking cessation, healthy diet, and increased activity  Follow-up in our office in 3 months  He will call sooner if needed  HPI:  I had the pleasure of meeting Kavon Flores in the office today to establish ongoing cardiac care  Kavon Flores was recently hospitalized following an STEMI  He presented to [facility] on 07/13/2022 with reports of chest pain  He was noted to have ST elevation on his EKG and was transferred to the [facility]  He underwent cardiac catheterization with stenting to a 100% mid LAD lesion  Residual disease was noted and he is planning for a staged intervention on a 90% PDA lesion  Dara Soulier He denies any recurrent chest pain, pressure, tightness, burning  He notes ongoing exertional dyspnea, however notes this was present for a long time prior to his MI  It has been improving since his intervention  He denies any dizziness, lightheadedness, palpitations, syncope  Since his MI, he has reduced his smoking from 3 packs per day to 6 cigarettes a day and is working to further decrease  He is planning to begin cardiac rehab next week    He was told previously that he could return to work, light duty, on 08/15/2022  Review of Systems   10  point ROS  was otherwise non pertinent or negative except as per HPI or as below  Gait: Normal      Most recent or relevant cardiac/vascular testing:    Echo 07/13/2022 EF 50%, akinesis of the apical anterior, apical septal, apical inferior, apical lateral segments and apex  Cardiac catheterization 07/13/2022    · Mid LAD lesion is 100% stenosed  · LPAV lesion is 90% stenosed  · Prox LAD lesion is 40% stenosed  Severe 2-vessel coronary artery disease with culprit mid LAD lesion which was intervened on  Residual PDA 90% lesion in dominant LCX  Objective:     /86   Pulse 80   Ht 5' 6" (1 676 m)   Wt 82 6 kg (182 lb)   BMI 29 38 kg/m²     PHYSICAL EXAM:    General:  Normal appearance, no acute distress  Eyes:  Anicteric  Oral mucosa:  Moist   Neck:  No JVD  Carotid upstrokes are brisk without bruits  No masses  Chest:  Clear to auscultation   Cardiac:  No palpable PMI  Normal S1 and S2  No murmur gallop or rub  Abdomen:  Soft and nontender  No palpable organomegaly or aortic enlargement  Extremities:  No peripheral edema  Musculoskeletal:  Symmetric  Vascular:  Pedal pulses are intact  Neuro:  Grossly symmetric  Psych:  Alert and oriented x3      No Known Allergies    Current Outpatient Medications:     aspirin (ECOTRIN LOW STRENGTH) 81 mg EC tablet, Take 1 tablet (81 mg total) by mouth daily, Disp: 30 tablet, Rfl: 0    atorvastatin (LIPITOR) 80 mg tablet, Take 1 tablet (80 mg total) by mouth daily with dinner, Disp: 30 tablet, Rfl: 11    metoprolol succinate (TOPROL-XL) 50 mg 24 hr tablet, Take 1 tablet (50 mg total) by mouth 2 (two) times a day, Disp: 60 tablet, Rfl: 11    nitroglycerin (NITROSTAT) 0 4 mg SL tablet, Place 1 tablet (0 4 mg total) under the tongue every 5 (five) minutes as needed for chest pain, Disp: 15 tablet, Rfl: 0    ticagrelor (BRILINTA) 90 MG, Take 1 tablet (90 mg total) by mouth 2 (two) times a day, Disp: 60 tablet, Rfl: 11  Past Medical History:   Diagnosis Date    Coronary artery disease     anterolateral STEMI involving the LAD, status post PCI    STEMI (ST elevation myocardial infarction)      Past Surgical History:   Procedure Laterality Date    CARDIAC CATHETERIZATION N/A 7/13/2022    Procedure: Cardiac pci;  Surgeon: Radha Toledo MD;  [facility]; Service: Cardiology    CARDIAC CATHETERIZATION N/A 7/13/2022    Procedure: Cardiac Coronary Angiogram;  Surgeon: Radha Toledo MD;  [facility]; Service: Cardiology    CARDIAC CATHETERIZATION  7/13/2022    Procedure: Cardiac catheterization;  Surgeon: Radha Toledo MD;  [facility];   Service: Cardiology       CMP:   Lab Results   Component Value Date    K 4 0 07/14/2022     07/14/2022    CO2 26 07/14/2022    BUN 13 07/14/2022    CREATININE 1 03 07/14/2022    EGFR 80 07/14/2022     Lipid Profile:    Lab Results   Component Value Date    TRIG 136 07/14/2022    HDL 43 07/14/2022         Social History     Tobacco Use   Smoking Status Current Every Day Smoker    Packs/day: 0 50    Types: Cigarettes   Smokeless Tobacco Never Used

## 2022-07-19 NOTE — ASSESSMENT & PLAN NOTE
Status post BRIAN to mid LAD 7/13/2022, planning staged intervention to PDA  Continue DAPT (aspirin, Brilinta,) statin, beta-blocker

## 2022-07-20 ENCOUNTER — PREP FOR PROCEDURE (OUTPATIENT)
Dept: CARDIOLOGY CLINIC | Facility: CLINIC | Age: 57
End: 2022-07-20

## 2022-07-20 DIAGNOSIS — I21.02 STEMI INVOLVING LEFT ANTERIOR DESCENDING CORONARY ARTERY (HCC): Primary | ICD-10-CM

## 2022-07-20 NOTE — TELEPHONE ENCOUNTER
Patient scheduled for PCI on 8/12/22 in [facility] with Dr Tomasa Higgins  Mailing patient instructions  Patient had labs drawn 7/14/22  Can I have auth?

## 2022-07-25 NOTE — PROGRESS NOTES
Cardiac Rehabilitation Plan of Care   Initial Care Plan      Today's date: 2022   # of Exercise Sessions Completed: Evaluation   Patient name: Demian Nazario      : 1965  Age: 65 y o  MRN: [id number]  Referring Physician: Liss Hayes MD  Cardiologist: Veronica Sandoval MD  Provider: [facility]  Clinician: Reggie Diaz MS    Dx: STEMI involving LAD  Date of onset: 22      SUMMARY OF PROGRESS:  Today is José's initial evaluation to begin Cardiac Rehab  The patient does currently follow a formal exercise program at home, including walking about 1/2 mile per day  He has resumed light to moderate ADLs reporting weakness and fatigue  Depression screening using the PHQ-9 interprets the patient's score 1-4 = Minimal Depression  JOHNNY-7 screening tool for anxiety suggests 0-4  = Not anxious  When addressed, the patient denies having depression/anxiety  Patient reports excellent social/emotional support  Information to begin using [address] was provided as well as contact information for counseling through [facility]  PHQ-9 score will be reassessed in 30 days  The patient is a current smoker who is not ready to quit and current smoker but has reduced the number of cigarettes to 6 per day  Patient admits to 100% medication compliance  Patient reports the following physical limitations: N/A  The patient completed an initial submaximal TM ETT  The patient completed 3 minutes of stage I (2 2METs) with test termination of RHR +30  Resting  /74 with appropriate hemodynamic response to exercise reaching 142/74  Patient denied symptoms during exercise  Telemetry revealed NSR w/ inverted T wave  Patient was counseled on exercise guidelines to achieve a minimum of 150 mins/wk of moderate intensity (RPE 4-6) exercise and encouraged to add 1-2 days of exercise on opposite days of cardiac rehab as tolerated   Patient's goals include: improve functional capacity, increase strength, make dietary changes, return to work without limitations or restrictions, return for stent placement , attend rehab regularly before returning to work  The patient's CAD risk factors include:  inactivity, stress, hypertension and smoking  His education will focus on lifestyle modification/education specific to His needs  Patient will attend group education classes on heart healthy eating, reading food labels, stress management, risk factor reduction, understanding heart disease and common heart medications  Patient will attend 35 monitored exercise sessions, 3x/wk for 12-18 weeks beginning 22  Patient stated he feels great since his procedure  Patient has no complaints, but stated he continues to smoke about 6/8 cigs a day  Patient was encouraged to complete smoking cessation  Patient is anxious about returning for stent  and would like to return to work soon       Medication compliance: Yes   Comments: Pt reports to be compliant with medications  Fall Risk: Low   Comments: Ambulates with a steady gait with no assist device    EKG Interpretation: NSR w/ inverted T wave      EXERCISE ASSESSMENT and PLAN    Current Exercise Program in Rehab:       Frequency: 3 days/week Supplement with home exercise 2+ days/wk as tolerated       Minutes: 35-40         METS: 2 0-2 5            HR: 20-30 beats above RHR   RPE: 4-6       Modalities: Treadmill, UBE and NuStep      Exercise Progression 30 Day Goals :    Frequency: 3 days/week of cardiac rehab     Supplement with home exercise 2+ days/wk as tolerated    Minutes: 45-50                          >150 mins/wk of moderate intensity exercise   METS: 2 5-3 0   HR: 20-30 beats above RHR   RPE: 4-6   Modalities: Treadmill, UBE, NuStep and Recumbent bike    Strength trainin-3 days / week   Modalities: Chest Press, Pull Downs and free weights    Home Exercise: Type: walking 1/2 mile daily     Goals: 10% improvement in functional capacity - based on max METs achieved in fitness assessment, Resume ADLs with increased strength, Return to work unrestricted, Attend Rehab regularly, Decrease sitting time and Start a walking program    Progression Toward Goals:  Reviewed Pt goals and determined plan of care, Patient will initiate setting walking goals weekly (aim for 1 mile) in the next 30 days    Education: benefit of exercise for CAD risk factors, home exercise guidelines, AHA guidelines to achieve >150 mins/wk of moderate exercise, RPE scale and class: Risk Factors for Heart Disease   Plan:education on home exercise guidelines, home exercise 30+ mins 2 days opposite CR and Education class: Risk Factors for Heart Disease  Readiness to change: Preparation:  (Getting ready to change)       NUTRITION ASSESSMENT AND PLAN    Weight control:    Starting weight: 185   Current weight:   185    Diabetes: N/A  A1c: 5 7    last measured: 7/13/22    Lipid management: Last lipid profile 7/14/22  Chol 194    HDL 43      Goals:LDL <100, HDL >40, TRG <150, CHOL <200, eliminate processed meats, eat 3 or more servings of whole grains a day, Eat 4-5 cups of fruits and vegetables daily, use olive or canola oil in baking, choose low sodium processed foods, eliminate butter and choose healthy snacks: light popcorn, plain pretzels    Progression Toward Goals: Reviewed Pt goals and determined plan of care, Patient will make dietary changes to see improvements in lipid panel and A1C in the next 30 days    Education: heart healthy eating  hydration  nutrition for  lipid management  wt  loss   healthy choices while dining out  portion control  Plan: replace butter with soft spreads made with olive oil, canola or yogurt, replace refined grain bread with whole grain bread, replace unhealthy snacks with fruits & vegs, reduce portion sizes, eat fewer desserts and sweets, avoid processed foods, drink more water and learn how to read food labels  Readiness to change: Preparation:  (Getting ready to change)       PSYCHOSOCIAL ASSESSMENT AND PLAN    Emotional:  Depression assessment:  PHQ-9 = 1-4 = Minimal Depression            Anxiety measure:  JOHNNY-7 = 0-4  = Not anxious  Self-reported stress level:  6  Social support: Very Good    Goals:  Reduce perceived stress to 1-3/10, Physical Fitness in [facility] Score < 3, Daily Activity in [facility] Score < 3, Increased interest in doing things, improved positive thoughts of well being and increased energy    Progression Toward Goals: Reviewed Pt goals and determined plan of care, Patient will enjoy hobbies to help relieve stress from work, spending time outside, watch TV  in the next 30 days    Education: signs/sxs of depression, benefits of a positive support system, stress management techniques and depression and CAD  Plan: Exercise, Spend time outdoors, Enjoy a hobby, Keep a positive mindset, Meet new people and Enjoy family  Readiness to change: Preparation:  (Getting ready to change)       OTHER CORE COMPONENTS     Tobacco:   Social History     Tobacco Use   Smoking Status Current Every Day Smoker    Packs/day: 0 50    Types: Cigarettes   Smokeless Tobacco Never Used       Tobacco Use Intervention:   Pt continues to smoke 6/8 cigs/day   Pt is not ready to quit  Suggested Pt enroll in [facility] Smoking Cessation Program    Anginal Symptoms:  chest pressure and SOB   NTG use: Compliant with carrying NTG, Understands proper use, Reviewed Proper use and Pt has not used NTG since event    Blood pressure:    Restin/74   Exercise: 142/74   Recovery: 130/76    Goals: consistent BP < 130/80, reduced dietary sodium <2300mg, moderate intensity exercise >150 mins/wk, medication compliance, reduce number of cigarettes/day, set a target quit date and make contact with a tobacco use prevention program    Progression Toward Goals: Reviewed Pt goals and determined plan of care, Patient will reduce the number cigs  in the next 30 days    Education:  understanding high blood pressure and it's relationship to CAD, low sodium diet and HTN, proper use of sublingual NTG, smoking and heart disease, tobacco triggers and setting a smoking cessation plan  Plan: Class: Common Heart Medications, Set target quit date for smoking, reduce number of cigarettes per day, avoid places with second hand smoke, Avoid Processed foods, engage in regular exercise, eliminate salt shaker at the table and use salt substitutes  Readiness to change: Preparation:  (Getting ready to change)       CARDIAC REHAB ASSESSMENT    Today's date: 2022  Patient name: Lloyd American     : 1965       MRN: [id number]  PCP: N/A  Referring Physician: Craig Lawson MD  Cardiologist: Sherryle Levels, MD  Surgeon: Mindy Bunch MD  Dx: STEMI    Date of onset: 22  Cultural needs: N/A    Weight    Wt Readings from Last 1 Encounters:   22 82 6 kg (182 lb)      Height:   Ht Readings from Last 1 Encounters:   22 5' 6" (1 676 m)     Medical History:   Past Medical History:   Diagnosis Date    Coronary artery disease     anterolateral STEMI involving the LAD, status post PCI    STEMI (ST elevation myocardial infarction)      Physical Limitations: N/A  Fall Risk: Low   Comments: Ambulates with a steady gait with no assist device    Anginal Equivalent: None/denies angina   NTG use: Compliant with carrying NTG, Understands proper use, Reviewed Proper use and Pt has not used NTG since event    Risk Factors   Cholesterol: Yes  Smoking: Current user:  average ppd:  6/8 cigs  HTN: Yes  DM: No  Obesity: No   Inactivity: Yes  Stress:  perceived  stress: 6/10   Stressors: current health condition, work, family    Goals for Stress Management:Exercise, Improve Time Management, Maintain a stress diary, Keep a positive mindset, Spend time outside and Enjoy a hobby    Family History:  Family History   Problem Relation Age of Onset    No Known Problems Mother     Coronary artery disease Father CABG    Heart attack Brother        Allergies: Patient has no known allergies  ETOH:   Social History     Substance and Sexual Activity   Alcohol Use No       Current Medications:   Current Outpatient Medications   Medication Sig Dispense Refill    aspirin (ECOTRIN LOW STRENGTH) 81 mg EC tablet Take 1 tablet (81 mg total) by mouth daily 30 tablet 0    atorvastatin (LIPITOR) 80 mg tablet Take 1 tablet (80 mg total) by mouth daily with dinner 30 tablet 11    metoprolol succinate (TOPROL-XL) 50 mg 24 hr tablet Take 1 tablet (50 mg total) by mouth 2 (two) times a day 60 tablet 11    nitroglycerin (NITROSTAT) 0 4 mg SL tablet Place 1 tablet (0 4 mg total) under the tongue every 5 (five) minutes as needed for chest pain 15 tablet 0    ticagrelor (BRILINTA) 90 MG Take 1 tablet (90 mg total) by mouth 2 (two) times a day 60 tablet 11     No current facility-administered medications for this visit  Functional Status Prior to Diagnosis for Treatment   Occupation: full time job [employer]   Recreation: "[email or web address]   ADLs: No limitations  [place]: No limitations  Exercise: walking, lifting   Other: some exposure     Current Functional Status  Occupation: full time job [employer]   Recreation: see above  ADLs:Capable of performing light to moderate ADLs  [place]: Capable of performing light to moderate ADLs  Exercise: light walking  Other: N/A    Patient Specific Goals:  improve functional capacity, increase strength, make dietary changes, return to work without limitations or restrictions, return for stent placement 8/12, attend rehab regularly before returning to work      Short Term Program Goals: dietary modifications increased strength improved energy/stamina with ADLs exercise 120-150 mins/wk    Long Term Goals: intial claudication time extended  increased maximal walking duration  increased intial training workload  Improved functional capacity  Improved lipid profile  Smoking cessation  Improved A1c    Ability to reach goals/rehabilitation potential:  Very Good   Projected return to function: 8-12 weeks  Objective tests: sub-max TM ETT  Nutritional   Reviewed details of Rate your Plate  Discussed key elements of heart healthy eating  Reviewed patient goals for dietary modifications and their clinical implications  Reviewed most recent lipid profile  Goals for dietary modification: poultry without the skin  increase whole grains  increase fruits and vegetables  eliminate butter  low sodium  improved snack choices  Emotional/Social  Patient reports he/she is coping well with good social support and denies depression or anxiety    Marital status: single    Domestic Violence Screening: No    Comments: Cardiac Rehab is medically necessary for the prevention of a secondary event  Patient stated he is ready to go back to work without limitations  Patient is feeling great since procedure and would like to start rehab to help build his strength up for work

## 2022-07-26 ENCOUNTER — CLINICAL SUPPORT (OUTPATIENT)
Dept: CARDIAC REHAB | Facility: HOSPITAL | Age: 57
End: 2022-07-26
Payer: COMMERCIAL

## 2022-07-26 DIAGNOSIS — I21.3 STEMI (ST ELEVATION MYOCARDIAL INFARCTION) (HCC): ICD-10-CM

## 2022-07-26 DIAGNOSIS — I21.02 STEMI INVOLVING LEFT ANTERIOR DESCENDING CORONARY ARTERY (HCC): Primary | ICD-10-CM

## 2022-07-26 PROCEDURE — 93797 PHYS/QHP OP CAR RHAB WO ECG: CPT

## 2022-07-26 NOTE — PROGRESS NOTES
Cardiac Rehabilitation Plan of Care   Initial Care Plan      Today's date: 2022   # of Exercise Sessions Completed: Evaluation   Patient name: Aki Hoffmann      : 1965  Age: 65 y o  MRN: [id number]  Referring Physician: Shruti Valenzuela MD  Cardiologist: Ly Bethea MD  Provider: [facility]  Clinician: Edilberto Charles MS    Dx: STEMI involving LAD  Date of onset: 22      SUMMARY OF PROGRESS:  Today is José's initial evaluation to begin Cardiac Rehab  The patient does currently follow a formal exercise program at home, including walking about 1/2 mile per day  He has resumed light to moderate ADLs reporting weakness and fatigue  Depression screening using the PHQ-9 interprets the patient's score 1-4 = Minimal Depression  JOHNNY-7 screening tool for anxiety suggests 0-4  = Not anxious  When addressed, the patient denies having depression/anxiety  Patient reports excellent social/emotional support  Information to begin using [employer] was provided as well as contact information for counseling through [employer]  PHQ-9 score will be reassessed in 30 days  The patient is a current smoker who is not ready to quit and current smoker but has reduced the number of cigarettes to 6 per day  Patient admits to 100% medication compliance  Patient reports the following physical limitations: N/A  The patient completed an initial submaximal TM ETT  The patient completed 3 minutes of stage I (2 2METs) with test termination of RHR +30  Resting  /74 with appropriate hemodynamic response to exercise reaching 142/74  Patient denied symptoms during exercise  Telemetry revealed NSR w/ inverted T wave  Patient was counseled on exercise guidelines to achieve a minimum of 150 mins/wk of moderate intensity (RPE 4-6) exercise and encouraged to add 1-2 days of exercise on opposite days of cardiac rehab as tolerated   Patient's goals include: improve functional capacity, increase strength, make dietary changes, return to work without limitations or restrictions, return for stent placement , attend rehab regularly before returning to work  The patient's CAD risk factors include:  inactivity, stress, hypertension and smoking  His education will focus on lifestyle modification/education specific to His needs  Patient will attend group education classes on heart healthy eating, reading food labels, stress management, risk factor reduction, understanding heart disease and common heart medications  Patient will attend 35 monitored exercise sessions, 3x/wk for 12-18 weeks beginning 22  Patient stated he feels great since his procedure  Patient has no complaints, but stated he continues to smoke about 6/8 cigs a day  Patient was encouraged to complete smoking cessation  Patient is anxious about returning for stent  and would like to return to work soon       Medication compliance: Yes   Comments: Pt reports to be compliant with medications  Fall Risk: Low   Comments: Ambulates with a steady gait with no assist device    EKG Interpretation: NSR w/ inverted T wave      EXERCISE ASSESSMENT and PLAN    Current Exercise Program in Rehab:       Frequency: 3 days/week Supplement with home exercise 2+ days/wk as tolerated       Minutes: 35-40         METS: 2 0-2 5            HR: 20-30 beats above RHR   RPE: 4-6       Modalities: Treadmill, UBE and NuStep      Exercise Progression 30 Day Goals :    Frequency: 3 days/week of cardiac rehab     Supplement with home exercise 2+ days/wk as tolerated    Minutes: 45-50                          >150 mins/wk of moderate intensity exercise   METS: 2 5-3 0   HR: 20-30 beats above RHR   RPE: 4-6   Modalities: Treadmill, UBE, NuStep and Recumbent bike    Strength trainin-3 days / week   Modalities: Chest Press, Pull Downs and free weights    Home Exercise: Type: walking 1/2 mile daily     Goals: 10% improvement in functional capacity - based on max METs achieved in fitness assessment, Resume ADLs with increased strength, Return to work unrestricted, Attend Rehab regularly, Decrease sitting time and Start a walking program    Progression Toward Goals:  Reviewed Pt goals and determined plan of care, Patient will initiate setting walking goals weekly (aim for 1 mile) in the next 30 days    Education: benefit of exercise for CAD risk factors, home exercise guidelines, AHA guidelines to achieve >150 mins/wk of moderate exercise, RPE scale and class: Risk Factors for Heart Disease   Plan:education on home exercise guidelines, home exercise 30+ mins 2 days opposite CR and Education class: Risk Factors for Heart Disease  Readiness to change: Preparation:  (Getting ready to change)       NUTRITION ASSESSMENT AND PLAN    Weight control:    Starting weight: 185   Current weight:   185    Diabetes: N/A  A1c: 5 7    last measured: 7/13/22    Lipid management: Last lipid profile 7/14/22  Chol 194    HDL 43      Goals:LDL <100, HDL >40, TRG <150, CHOL <200, eliminate processed meats, eat 3 or more servings of whole grains a day, Eat 4-5 cups of fruits and vegetables daily, use olive or canola oil in baking, choose low sodium processed foods, eliminate butter and choose healthy snacks: light popcorn, plain pretzels    Progression Toward Goals: Reviewed Pt goals and determined plan of care, Patient will make dietary changes to see improvements in lipid panel and A1C in the next 30 days    Education: heart healthy eating  hydration  nutrition for  lipid management  wt  loss   healthy choices while dining out  portion control  Plan: replace butter with soft spreads made with olive oil, canola or yogurt, replace refined grain bread with whole grain bread, replace unhealthy snacks with fruits & vegs, reduce portion sizes, eat fewer desserts and sweets, avoid processed foods, drink more water and learn how to read food labels  Readiness to change: Preparation:  (Getting ready to change)       PSYCHOSOCIAL ASSESSMENT AND PLAN    Emotional:  Depression assessment:  PHQ-9 = 1-4 = Minimal Depression            Anxiety measure:  JOHNNY-7 = 0-4  = Not anxious  Self-reported stress level:  6  Social support: Very Good    Goals:  Reduce perceived stress to 1-3/10, Physical Fitness in [facility] Score < 3, Daily Activity in [facility] Score < 3, Increased interest in doing things, improved positive thoughts of well being and increased energy    Progression Toward Goals: Reviewed Pt goals and determined plan of care, Patient will enjoy hobbies to help relieve stress from work, spending time outside, watch TV  in the next 30 days    Education: signs/sxs of depression, benefits of a positive support system, stress management techniques and depression and CAD  Plan: Exercise, Spend time outdoors, Enjoy a hobby, Keep a positive mindset, Meet new people and Enjoy family  Readiness to change: Preparation:  (Getting ready to change)       OTHER CORE COMPONENTS     Tobacco:   Social History     Tobacco Use   Smoking Status Current Every Day Smoker    Packs/day: 0 50    Types: Cigarettes   Smokeless Tobacco Never Used       Tobacco Use Intervention:   Pt continues to smoke 6/8 cigs/day   Pt is not ready to quit  Suggested Pt enroll in [facility] Smoking Cessation Program    Anginal Symptoms:  chest pressure and SOB   NTG use: Compliant with carrying NTG, Understands proper use, Reviewed Proper use and Pt has not used NTG since event    Blood pressure:    Restin/74   Exercise: 142/74   Recovery: 130/76    Goals: consistent BP < 130/80, reduced dietary sodium <2300mg, moderate intensity exercise >150 mins/wk, medication compliance, reduce number of cigarettes/day, set a target quit date and make contact with a tobacco use prevention program    Progression Toward Goals: Reviewed Pt goals and determined plan of care, Patient will reduce the number cigs  in the next 30 days    Education:  understanding high blood pressure and it's relationship to CAD, low sodium diet and HTN, proper use of sublingual NTG, smoking and heart disease, tobacco triggers and setting a smoking cessation plan  Plan: Class: Common Heart Medications, Set target quit date for smoking, reduce number of cigarettes per day, avoid places with second hand smoke, Avoid Processed foods, engage in regular exercise, eliminate salt shaker at the table and use salt substitutes  Readiness to change: Preparation:  (Getting ready to change)       CARDIAC REHAB ASSESSMENT    Today's date: 2022  Patient name: Jasiel Curtis     : 1965       MRN: [id number]  PCP: N/A  Referring Physician: Alayna Gambino MD  Cardiologist: Lc Healy MD  Surgeon: Karina Valerio MD  Dx: STEMI    Date of onset: 22  Cultural needs: N/A    Weight    Wt Readings from Last 1 Encounters:   22 82 6 kg (182 lb)      Height:   Ht Readings from Last 1 Encounters:   22 5' 6" (1 676 m)     Medical History:   Past Medical History:   Diagnosis Date    Coronary artery disease     anterolateral STEMI involving the LAD, status post PCI    STEMI (ST elevation myocardial infarction)      Physical Limitations: N/A  Fall Risk: Low   Comments: Ambulates with a steady gait with no assist device    Anginal Equivalent: None/denies angina   NTG use: Compliant with carrying NTG, Understands proper use, Reviewed Proper use and Pt has not used NTG since event    Risk Factors   Cholesterol: Yes  Smoking: Current user:  average ppd:  6/8 cigs  HTN: Yes  DM: No  Obesity: No   Inactivity: Yes  Stress:  perceived  stress: 6/10   Stressors: current health condition, work, family    Goals for Stress Management:Exercise, Improve Time Management, Maintain a stress diary, Keep a positive mindset, Spend time outside and Enjoy a hobby    Family History:  Family History   Problem Relation Age of Onset    No Known Problems Mother     Coronary artery disease Father CABG    Heart attack Brother        Allergies: Patient has no known allergies  ETOH:   Social History     Substance and Sexual Activity   Alcohol Use No       Current Medications:   Current Outpatient Medications   Medication Sig Dispense Refill    aspirin (ECOTRIN LOW STRENGTH) 81 mg EC tablet Take 1 tablet (81 mg total) by mouth daily 30 tablet 0    atorvastatin (LIPITOR) 80 mg tablet Take 1 tablet (80 mg total) by mouth daily with dinner 30 tablet 11    metoprolol succinate (TOPROL-XL) 50 mg 24 hr tablet Take 1 tablet (50 mg total) by mouth 2 (two) times a day 60 tablet 11    nitroglycerin (NITROSTAT) 0 4 mg SL tablet Place 1 tablet (0 4 mg total) under the tongue every 5 (five) minutes as needed for chest pain 15 tablet 0    ticagrelor (BRILINTA) 90 MG Take 1 tablet (90 mg total) by mouth 2 (two) times a day 60 tablet 11     No current facility-administered medications for this visit  Functional Status Prior to Diagnosis for Treatment   Occupation: full time job [employer]   Recreation: [employer]   ADLs: No limitations  [place]: No limitations  Exercise: walking, lifting   Other: some exposure     Current Functional Status  Occupation: full time job [email or web address]   Recreation: see above  ADLs:Capable of performing light to moderate ADLs  [place]: Capable of performing light to moderate ADLs  Exercise: light walking  Other: N/A    Patient Specific Goals:  improve functional capacity, increase strength, make dietary changes, return to work without limitations or restrictions, return for stent placement 8/12, attend rehab regularly before returning to work      Short Term Program Goals: dietary modifications increased strength improved energy/stamina with ADLs exercise 120-150 mins/wk    Long Term Goals: intial claudication time extended  increased maximal walking duration  increased intial training workload  Improved functional capacity  Improved lipid profile  Smoking cessation  Improved A1c    Ability to reach goals/rehabilitation potential:  Very Good   Projected return to function: 8-12 weeks  Objective tests: sub-max TM ETT  Nutritional   Reviewed details of Rate your Plate  Discussed key elements of heart healthy eating  Reviewed patient goals for dietary modifications and their clinical implications  Reviewed most recent lipid profile  Goals for dietary modification: poultry without the skin  increase whole grains  increase fruits and vegetables  eliminate butter  low sodium  improved snack choices  Emotional/Social  Patient reports he/she is coping well with good social support and denies depression or anxiety    Marital status: single    Domestic Violence Screening: No    Comments: Cardiac Rehab is medically necessary for the prevention of a secondary event  Patient stated he is ready to go back to work without limitations  Patient is feeling great since procedure and would like to start rehab to help build his strength up for work

## 2022-08-01 ENCOUNTER — CLINICAL SUPPORT (OUTPATIENT)
Dept: CARDIAC REHAB | Facility: HOSPITAL | Age: 57
End: 2022-08-01
Payer: COMMERCIAL

## 2022-08-01 DIAGNOSIS — I21.02 STEMI INVOLVING LEFT ANTERIOR DESCENDING CORONARY ARTERY (HCC): ICD-10-CM

## 2022-08-01 PROCEDURE — 93798 PHYS/QHP OP CAR RHAB W/ECG: CPT

## 2022-08-03 ENCOUNTER — APPOINTMENT (OUTPATIENT)
Dept: CARDIAC REHAB | Facility: HOSPITAL | Age: 57
End: 2022-08-03
Payer: COMMERCIAL

## 2022-08-05 ENCOUNTER — CLINICAL SUPPORT (OUTPATIENT)
Dept: CARDIAC REHAB | Facility: HOSPITAL | Age: 57
End: 2022-08-05
Payer: COMMERCIAL

## 2022-08-05 DIAGNOSIS — I21.02 STEMI INVOLVING LEFT ANTERIOR DESCENDING CORONARY ARTERY (HCC): ICD-10-CM

## 2022-08-05 PROCEDURE — 93798 PHYS/QHP OP CAR RHAB W/ECG: CPT

## 2022-08-08 ENCOUNTER — CLINICAL SUPPORT (OUTPATIENT)
Dept: CARDIAC REHAB | Facility: HOSPITAL | Age: 57
End: 2022-08-08
Payer: COMMERCIAL

## 2022-08-08 DIAGNOSIS — I21.02 STEMI INVOLVING LEFT ANTERIOR DESCENDING CORONARY ARTERY (HCC): ICD-10-CM

## 2022-08-08 PROCEDURE — 93798 PHYS/QHP OP CAR RHAB W/ECG: CPT

## 2022-08-10 ENCOUNTER — CLINICAL SUPPORT (OUTPATIENT)
Dept: CARDIAC REHAB | Facility: HOSPITAL | Age: 57
End: 2022-08-10
Payer: COMMERCIAL

## 2022-08-10 DIAGNOSIS — I21.02 STEMI INVOLVING LEFT ANTERIOR DESCENDING CORONARY ARTERY (HCC): ICD-10-CM

## 2022-08-10 PROCEDURE — 93798 PHYS/QHP OP CAR RHAB W/ECG: CPT

## 2022-08-12 ENCOUNTER — HOSPITAL ENCOUNTER (OUTPATIENT)
Facility: HOSPITAL | Age: 57
Setting detail: OUTPATIENT SURGERY
Discharge: HOME | End: 2022-08-12
Attending: INTERNAL MEDICINE | Admitting: INTERNAL MEDICINE
Payer: COMMERCIAL

## 2022-08-12 ENCOUNTER — APPOINTMENT (OUTPATIENT)
Dept: CARDIAC REHAB | Facility: HOSPITAL | Age: 57
End: 2022-08-12
Payer: COMMERCIAL

## 2022-08-12 VITALS
WEIGHT: 184 LBS | TEMPERATURE: 97.9 F | HEART RATE: 65 BPM | OXYGEN SATURATION: 95 % | RESPIRATION RATE: 17 BRPM | SYSTOLIC BLOOD PRESSURE: 112 MMHG | BODY MASS INDEX: 29.57 KG/M2 | DIASTOLIC BLOOD PRESSURE: 64 MMHG | HEIGHT: 66 IN

## 2022-08-12 DIAGNOSIS — I21.02 STEMI INVOLVING LEFT ANTERIOR DESCENDING CORONARY ARTERY (HCC): ICD-10-CM

## 2022-08-12 DIAGNOSIS — Z95.820 S/P ANGIOPLASTY WITH STENT: Primary | ICD-10-CM

## 2022-08-12 PROBLEM — I25.10 CORONARY ARTERY DISEASE INVOLVING NATIVE CORONARY ARTERY: Status: ACTIVE | Noted: 2022-08-12

## 2022-08-12 LAB
ANION GAP SERPL CALCULATED.3IONS-SCNC: 3 MMOL/L (ref 4–13)
BUN SERPL-MCNC: 16 MG/DL (ref 5–25)
CALCIUM SERPL-MCNC: 9.6 MG/DL (ref 8.3–10.1)
CHLORIDE SERPL-SCNC: 109 MMOL/L (ref 96–108)
CO2 SERPL-SCNC: 28 MMOL/L (ref 21–32)
CREAT SERPL-MCNC: 1.15 MG/DL (ref 0.6–1.3)
GFR SERPL CREATININE-BSD FRML MDRD: 70 ML/MIN/1.73SQ M
GLUCOSE P FAST SERPL-MCNC: 115 MG/DL (ref 65–99)
GLUCOSE SERPL-MCNC: 115 MG/DL (ref 65–140)
KCT BLD-ACNC: 329 SEC (ref 89–137)
POTASSIUM SERPL-SCNC: 4 MMOL/L (ref 3.5–5.3)
SODIUM SERPL-SCNC: 140 MMOL/L (ref 135–147)
SPECIMEN SOURCE: ABNORMAL

## 2022-08-12 PROCEDURE — C1894 INTRO/SHEATH, NON-LASER: HCPCS | Performed by: INTERNAL MEDICINE

## 2022-08-12 PROCEDURE — NC001 PR NO CHARGE: Performed by: INTERNAL MEDICINE

## 2022-08-12 PROCEDURE — 99153 MOD SED SAME PHYS/QHP EA: CPT | Performed by: INTERNAL MEDICINE

## 2022-08-12 PROCEDURE — 93454 CORONARY ARTERY ANGIO S&I: CPT | Performed by: INTERNAL MEDICINE

## 2022-08-12 PROCEDURE — 85347 COAGULATION TIME ACTIVATED: CPT

## 2022-08-12 PROCEDURE — 92928 PRQ TCAT PLMT NTRAC ST 1 LES: CPT | Performed by: INTERNAL MEDICINE

## 2022-08-12 PROCEDURE — 80048 BASIC METABOLIC PNL TOTAL CA: CPT | Performed by: INTERNAL MEDICINE

## 2022-08-12 PROCEDURE — C1769 GUIDE WIRE: HCPCS | Performed by: INTERNAL MEDICINE

## 2022-08-12 PROCEDURE — C1887 CATHETER, GUIDING: HCPCS | Performed by: INTERNAL MEDICINE

## 2022-08-12 PROCEDURE — C1874 STENT, COATED/COV W/DEL SYS: HCPCS | Performed by: INTERNAL MEDICINE

## 2022-08-12 PROCEDURE — C1725 CATH, TRANSLUMIN NON-LASER: HCPCS | Performed by: INTERNAL MEDICINE

## 2022-08-12 PROCEDURE — C9600 PERC DRUG-EL COR STENT SING: HCPCS | Performed by: INTERNAL MEDICINE

## 2022-08-12 PROCEDURE — 99152 MOD SED SAME PHYS/QHP 5/>YRS: CPT | Performed by: INTERNAL MEDICINE

## 2022-08-12 PROCEDURE — 93005 ELECTROCARDIOGRAM TRACING: CPT

## 2022-08-12 DEVICE — XIENCE SKYPOINT™ EVEROLIMUS ELUTING CORONARY STENT SYSTEM 2.25 MM X 15 MM / RAPID-EXCHANGE
Type: IMPLANTABLE DEVICE | Site: CORONARY | Status: FUNCTIONAL
Brand: XIENCE SKYPOINT™

## 2022-08-12 RX ORDER — ASPIRIN 81 MG/1
324 TABLET, CHEWABLE ORAL ONCE
Status: COMPLETED | OUTPATIENT
Start: 2022-08-12 | End: 2022-08-12

## 2022-08-12 RX ORDER — MIDAZOLAM HYDROCHLORIDE 2 MG/2ML
INJECTION, SOLUTION INTRAMUSCULAR; INTRAVENOUS AS NEEDED
Status: DISCONTINUED | OUTPATIENT
Start: 2022-08-12 | End: 2022-08-12 | Stop reason: HOSPADM

## 2022-08-12 RX ORDER — VERAPAMIL HCL 2.5 MG/ML
AMPUL (ML) INTRAVENOUS AS NEEDED
Status: DISCONTINUED | OUTPATIENT
Start: 2022-08-12 | End: 2022-08-12 | Stop reason: HOSPADM

## 2022-08-12 RX ORDER — LIDOCAINE HYDROCHLORIDE 10 MG/ML
INJECTION, SOLUTION EPIDURAL; INFILTRATION; INTRACAUDAL; PERINEURAL AS NEEDED
Status: DISCONTINUED | OUTPATIENT
Start: 2022-08-12 | End: 2022-08-12 | Stop reason: HOSPADM

## 2022-08-12 RX ORDER — HEPARIN SODIUM 1000 [USP'U]/ML
INJECTION, SOLUTION INTRAVENOUS; SUBCUTANEOUS AS NEEDED
Status: DISCONTINUED | OUTPATIENT
Start: 2022-08-12 | End: 2022-08-12 | Stop reason: HOSPADM

## 2022-08-12 RX ORDER — FENTANYL CITRATE 50 UG/ML
INJECTION, SOLUTION INTRAMUSCULAR; INTRAVENOUS AS NEEDED
Status: DISCONTINUED | OUTPATIENT
Start: 2022-08-12 | End: 2022-08-12 | Stop reason: HOSPADM

## 2022-08-12 RX ORDER — SODIUM CHLORIDE 9 MG/ML
125 INJECTION, SOLUTION INTRAVENOUS CONTINUOUS
Status: DISPENSED | OUTPATIENT
Start: 2022-08-12 | End: 2022-08-12

## 2022-08-12 RX ORDER — NITROGLYCERIN 20 MG/100ML
INJECTION INTRAVENOUS AS NEEDED
Status: DISCONTINUED | OUTPATIENT
Start: 2022-08-12 | End: 2022-08-12 | Stop reason: HOSPADM

## 2022-08-12 RX ORDER — SODIUM CHLORIDE 9 MG/ML
125 INJECTION, SOLUTION INTRAVENOUS CONTINUOUS
Status: DISCONTINUED | OUTPATIENT
Start: 2022-08-12 | End: 2022-08-12 | Stop reason: HOSPADM

## 2022-08-12 RX ADMIN — SODIUM CHLORIDE 125 ML/HR: 0.9 INJECTION, SOLUTION INTRAVENOUS at 09:04

## 2022-08-12 RX ADMIN — ASPIRIN 81 MG CHEWABLE TABLET 243 MG: 81 TABLET CHEWABLE at 08:58

## 2022-08-12 NOTE — DISCHARGE SUMMARY
Discharge Summary - Pearletha Angelucci 65 y o  male MRN: [id number]    Unit/Bed#: BE CATH LAB ROOM Encounter: [id number]    Admission Date: 8/12/2022   Discharge Date: 8/12/2022    Disposition: Home    Condition at Discharge: good     PCP:No primary care provider on file  OP Cardiologist: Nicole Arriolaelor, [address]     Interventional cardiologist:  Dr Karen Lowe    Admitting Diagnosis:    Known CAD admitted for staged PCI    Secondary Diagnoses:   STEMI - status post PCI and drug-eluting stent to mid LAD on 07/13/2022  Tobacco abuse  Hyperlipidemia    Discharge Diagnosis:  S/P PCI & [address]       Patient was electively admitted for staged PCI of L PDA  Right radial approach was utilized  There were no postprocedure complications  Patient was a same-day discharge to home with follow-up office visit with Oracio Love and Quiana rodriguez office  Patient will have a BMP on Monday August 15  Cardiac rehab may now be initiated  Patient has been recounts a on smoking cessation but continues to smoke      Pertinent Labs/diagnostics:        Lab Ressults:  Recent Results (from the past 24 hour(s))   Basic metabolic panel    Collection Time: 08/12/22  9:06 AM   Result Value Ref Range    Sodium 140 135 - 147 mmol/L    Potassium 4 0 3 5 - 5 3 mmol/L    Chloride 109 (H) 96 - 108 mmol/L    CO2 28 21 - 32 mmol/L    ANION GAP 3 (L) 4 - 13 mmol/L    BUN 16 5 - 25 mg/dL    Creatinine 1 15 0 60 - 1 30 mg/dL    Glucose 115 65 - 140 mg/dL    Glucose, Fasting 115 (H) 65 - 99 mg/dL    Calcium 9 6 8 3 - 10 1 mg/dL    eGFR 70 ml/min/1 73sq m       Lipid Profile:   No results found for: CHOL  Lab Results   Component Value Date    HDL 43 07/14/2022    HDL 46 07/13/2022     Lab Results   Component Value Date    LDLCALC 124 (H) 07/14/2022    LDLCALC 153 (H) 07/13/2022     Lab Results   Component Value Date    TRIG 136 07/14/2022    TRIG 114 07/13/2022       Discharge instructions/Information to patient and family:   See after visit summary for information provided to patient and family  Provisions for Follow-Up Care:  See after visit summary for information related to follow-up care and any pertinent home health orders  Planned Readmission: No    Discharge Statement:  I spent 30 minutes minutes discharging the patient  This time was spent on the day of discharge  I had direct contact with the patient on the day of discharge  Additional documentation is required if more than 30 minutes were spent on discharge  ** Please Note: Fluency Direct Dictation voice to text software may have been used in the creation of this document   **

## 2022-08-12 NOTE — PROGRESS NOTES
Verbal report given to Destini Samuel RN  Patient tolerated procedure without any complications  Dr Brad Carr to speak with the patient and family post procedure  Right wrist dry and intact with 11ml of air in the TR band  Site to be verified with receiving RN  Any changes from above assessment will be documented by receiving RN

## 2022-08-12 NOTE — LETTER
[facility],[address]10 CATH LAB  [address] [address]  Dept: [phone]    August 12, 2022     Patient: Colon American   YOB: 1965   Date of Visit: 8/12/2022       To Whom it May Concern:    Meche Robles is under my professional care  He was seen in the hospital from  08/12/22   He may return to work on 8/18/22 without limitations  If you have any questions or concerns, please don't hesitate to call           Sincerely,          KELVIN Gerber

## 2022-08-12 NOTE — LETTER
[facility] [facility] [address]  [address] [facility] [address]  Dept: [phone]    August 12, 2022     Patient: John Lopez   YOB: 1965   Date of Visit: 08/12/22       To Whom it May Concern:    Palmira Giles is under my professional care  He was seen in the hospital from  to 08/12/22  He may return to work on 8/18/22 without limitations  If you have any questions or concerns, please don't hesitate to call           Sincerely,          KELVIN Price

## 2022-08-12 NOTE — INTERVAL H&P NOTE
H&P reviewed  After examining the patient I find no changes in the patients condition since the H&P had been written      For staged PCI to LPDA, 1 month post STEMI    Vitals:    08/12/22 0903   BP: 128/78   Pulse: 69   Resp: 18   Temp: 98 5 °F (36 9 °C)   SpO2: 98%     Chris Stokes MD / 08/12/22 / 11:25 AM

## 2022-08-15 ENCOUNTER — APPOINTMENT (OUTPATIENT)
Dept: CARDIAC REHAB | Facility: HOSPITAL | Age: 57
End: 2022-08-15
Payer: COMMERCIAL

## 2022-08-15 LAB
ATRIAL RATE: 61 BPM
ATRIAL RATE: 71 BPM
P AXIS: 53 DEGREES
P AXIS: 61 DEGREES
PR INTERVAL: 126 MS
PR INTERVAL: 140 MS
QRS AXIS: 44 DEGREES
QRS AXIS: 53 DEGREES
QRSD INTERVAL: 102 MS
QRSD INTERVAL: 88 MS
QT INTERVAL: 422 MS
QT INTERVAL: 474 MS
QTC INTERVAL: 458 MS
QTC INTERVAL: 477 MS
T WAVE AXIS: 113 DEGREES
T WAVE AXIS: 115 DEGREES
VENTRICULAR RATE: 61 BPM
VENTRICULAR RATE: 71 BPM

## 2022-08-15 PROCEDURE — 93010 ELECTROCARDIOGRAM REPORT: CPT | Performed by: INTERNAL MEDICINE

## 2022-08-17 ENCOUNTER — CLINICAL SUPPORT (OUTPATIENT)
Dept: CARDIAC REHAB | Facility: HOSPITAL | Age: 57
End: 2022-08-17
Payer: COMMERCIAL

## 2022-08-17 DIAGNOSIS — I21.02 STEMI INVOLVING LEFT ANTERIOR DESCENDING CORONARY ARTERY (HCC): ICD-10-CM

## 2022-08-17 PROCEDURE — 93798 PHYS/QHP OP CAR RHAB W/ECG: CPT

## 2022-08-19 ENCOUNTER — APPOINTMENT (OUTPATIENT)
Dept: LAB | Facility: HOSPITAL | Age: 57
End: 2022-08-19
Payer: COMMERCIAL

## 2022-08-19 ENCOUNTER — CLINICAL SUPPORT (OUTPATIENT)
Dept: CARDIAC REHAB | Facility: HOSPITAL | Age: 57
End: 2022-08-19
Payer: COMMERCIAL

## 2022-08-19 DIAGNOSIS — I21.02 STEMI INVOLVING LEFT ANTERIOR DESCENDING CORONARY ARTERY (HCC): ICD-10-CM

## 2022-08-19 DIAGNOSIS — Z95.820 S/P ANGIOPLASTY WITH STENT: ICD-10-CM

## 2022-08-19 LAB
ANION GAP SERPL CALCULATED.3IONS-SCNC: 9 MMOL/L (ref 4–13)
BUN SERPL-MCNC: 18 MG/DL (ref 5–25)
CALCIUM SERPL-MCNC: 8.9 MG/DL (ref 8.3–10.1)
CHLORIDE SERPL-SCNC: 98 MMOL/L (ref 96–108)
CO2 SERPL-SCNC: 27 MMOL/L (ref 21–32)
CREAT SERPL-MCNC: 1.23 MG/DL (ref 0.6–1.3)
GFR SERPL CREATININE-BSD FRML MDRD: 65 ML/MIN/1.73SQ M
GLUCOSE SERPL-MCNC: 93 MG/DL (ref 65–140)
POTASSIUM SERPL-SCNC: 4 MMOL/L (ref 3.5–5.3)
SODIUM SERPL-SCNC: 134 MMOL/L (ref 135–147)

## 2022-08-19 PROCEDURE — 93798 PHYS/QHP OP CAR RHAB W/ECG: CPT

## 2022-08-19 PROCEDURE — 80048 BASIC METABOLIC PNL TOTAL CA: CPT

## 2022-08-19 PROCEDURE — 36415 COLL VENOUS BLD VENIPUNCTURE: CPT

## 2022-08-22 ENCOUNTER — CLINICAL SUPPORT (OUTPATIENT)
Dept: CARDIAC REHAB | Facility: HOSPITAL | Age: 57
End: 2022-08-22
Payer: COMMERCIAL

## 2022-08-22 DIAGNOSIS — I21.02 ST ELEVATION MYOCARDIAL INFARCTION INVOLVING LEFT ANTERIOR DESCENDING CORONARY ARTERY (HCC): Primary | ICD-10-CM

## 2022-08-22 PROCEDURE — 93798 PHYS/QHP OP CAR RHAB W/ECG: CPT

## 2022-08-22 NOTE — PROGRESS NOTES
Cardiac Rehabilitation Plan of Care   Progress Note      Today's date: 2022   # of Exercise Sessions Completed:   Patient name: Ladonna Edmonds      : 1965  Age: 65 y o  MRN: [id number]  Referring Physician: Junior Brownlee MD  Cardiologist: Srikanth Richter MD  Provider: [facility]  Clinician: Jones Grossman RN    Dx: STEMI involving LAD  Date of onset: 22      SUMMARY OF PROGRESS: Avis Martinez has attended 8 sessions  He has 6 sessions then had another stent placed in his LPDA on    He returned to rehab on 8/15   He is a current smoker who is not ready to quit and  smokes 15 per day  He did return to work and comes to rehab after work  His resting BP is 118/74 HR 88  His exercise BP is 130/62 105-113HR Recovery /64 96 HR  CAD risk factors include:  inactivity, stress, hypertension and smoking  He has received education on smoking cessation and risk factor reduction  He doesn`t eat breakfast and snacks at work   He was encouraged to eat healthy real food  fruits and vegetables      Medication compliance: Yes   Comments: Pt reports to be compliant with medications  Fall Risk: Low   Comments: Ambulates with a steady gait with no assist device    EKG Interpretation: NSR      EXERCISE ASSESSMENT and PLAN    Current Exercise Program in Rehab:       Frequency: 3 days/week Supplement with home exercise 2+ days/wk as tolerated       Minutes: 35-40         METS: 2 3-3 87           HR:    RPE: 4-6       Modalities: Treadmill, UBE and NuStep      Exercise Progression 30 Day Goals :    Frequency: 3 days/week of cardiac rehab     Supplement with home exercise 2+ days/wk as tolerated    Minutes: 45-50                          >150 mins/wk of moderate intensity exercise   METS: 3-4 0   HR: 20-30 beats above RHR   RPE: 4-6   Modalities: Treadmill, UBE, NuStep and Recumbent bike    Strength trainin-3 days / week   Modalities: Chest Press, Pull Downs and free weights    Home Exercise: Type: walking 1/2 mile daily     Goals: 10% improvement in functional capacity - based on max METs achieved in fitness assessment, Resume ADLs with increased strength, Return to work unrestricted, Attend Rehab regularly, Decrease sitting time and Start a walking program    Progression Toward Goals:  Reviewed Pt goals and determined plan of care, Patient will initiate setting walking goals weekly (aim for 1 mile) in the next 30 days    Education: benefit of exercise for CAD risk factors, home exercise guidelines, AHA guidelines to achieve >150 mins/wk of moderate exercise, RPE scale and class: Risk Factors for Heart Disease   Plan:education on home exercise guidelines, home exercise 30+ mins 2 days opposite CR and Education class: Risk Factors for Heart Disease  Readiness to change: Preparation:  (Getting ready to change)       NUTRITION ASSESSMENT AND PLAN    Weight control:    Starting weight: 185   Current weight:   185    Diabetes: N/A  A1c: 5 7    last measured: 7/13/22    Lipid management: Last lipid profile 7/14/22  Chol 194    HDL 43      Goals:LDL <100, HDL >40, TRG <150, CHOL <200, eliminate processed meats, eat 3 or more servings of whole grains a day, Eat 4-5 cups of fruits and vegetables daily, use olive or canola oil in baking, choose low sodium processed foods, eliminate butter and choose healthy snacks: light popcorn, plain pretzels    Progression Toward Goals: Reviewed Pt goals and determined plan of care, Patient will make dietary changes to see improvements in lipid panel and A1C in the next 30 days    Education: heart healthy eating  hydration  nutrition for  lipid management  wt  loss   healthy choices while dining out  portion control  Plan: replace butter with soft spreads made with olive oil, canola or yogurt, replace refined grain bread with whole grain bread, replace unhealthy snacks with fruits & vegs, reduce portion sizes, eat fewer desserts and sweets, avoid processed foods, drink more water and learn how to read food labels  Readiness to change: Preparation:  (Getting ready to change)       PSYCHOSOCIAL ASSESSMENT AND PLAN    Emotional:  Depression assessment:  PHQ-9 = 1-4 = Minimal Depression            Anxiety measure:  JOHNNY-7 = 0-4  = Not anxious  Self-reported stress level:  6  Social support: Very Good    Goals:  Reduce perceived stress to 1-3/10, Physical Fitness in [facility] Score < 3, Daily Activity in [facility] Score < 3, Increased interest in doing things, improved positive thoughts of well being and increased energy    Progression Toward Goals: Reviewed Pt goals and determined plan of care, Patient will enjoy hobbies to help relieve stress from work, spending time outside, watch TV  in the next 30 days    Education: signs/sxs of depression, benefits of a positive support system, stress management techniques and depression and CAD  Plan: Exercise, Spend time outdoors, Enjoy a hobby, Keep a positive mindset, Meet new people and Enjoy family  Readiness to change: Preparation:  (Getting ready to change)       OTHER CORE COMPONENTS     Tobacco:   Social History     Tobacco Use   Smoking Status Current Every Day Smoker    Packs/day: 0 50    Types: Cigarettes   Smokeless Tobacco Never Used       Tobacco Use Intervention:   Pt continues to smoke 6/8 cigs/day   Pt is not ready to quit  Suggested Pt enroll in [facility] [address] Smoking Cessation Program    Anginal Symptoms:  chest pressure and SOB   NTG use: Compliant with carrying NTG, Understands proper use, Reviewed Proper use and Pt has not used NTG since event    Blood pressure:    Restin/74   Exercise: 130/60   Recovery: 130/76    Goals: consistent BP < 130/80, reduced dietary sodium <2300mg, moderate intensity exercise >150 mins/wk, medication compliance, reduce number of cigarettes/day, set a target quit date and make contact with a tobacco use prevention program    Progression Toward Goals: Reviewed Pt goals and determined plan of care, Patient will reduce the number cigs  in the next 30 days    Education:  understanding high blood pressure and it's relationship to CAD, low sodium diet and HTN, proper use of sublingual NTG, smoking and heart disease, tobacco triggers and setting a smoking cessation plan  Plan: Class: Common Heart Medications, Set target quit date for smoking, reduce number of cigarettes per day, avoid places with second hand smoke, Avoid Processed foods, engage in regular exercise, eliminate salt shaker at the table and use salt substitutes  Readiness to change: Preparation:  (Getting ready to change)

## 2022-08-24 ENCOUNTER — CLINICAL SUPPORT (OUTPATIENT)
Dept: CARDIAC REHAB | Facility: HOSPITAL | Age: 57
End: 2022-08-24
Payer: COMMERCIAL

## 2022-08-24 DIAGNOSIS — I21.02 STEMI INVOLVING LEFT ANTERIOR DESCENDING CORONARY ARTERY (HCC): ICD-10-CM

## 2022-08-24 PROCEDURE — 93798 PHYS/QHP OP CAR RHAB W/ECG: CPT

## 2022-08-25 DIAGNOSIS — I21.3 STEMI (ST ELEVATION MYOCARDIAL INFARCTION) (HCC): ICD-10-CM

## 2022-08-25 RX ORDER — METOPROLOL SUCCINATE 50 MG/1
50 TABLET, EXTENDED RELEASE ORAL 2 TIMES DAILY
Qty: 90 TABLET | Refills: 3 | Status: SHIPPED | OUTPATIENT
Start: 2022-08-25 | End: 2022-08-25 | Stop reason: SDUPTHER

## 2022-08-25 RX ORDER — ATORVASTATIN CALCIUM 80 MG/1
80 TABLET, FILM COATED ORAL
Qty: 90 TABLET | Refills: 3 | Status: SHIPPED | OUTPATIENT
Start: 2022-08-25

## 2022-08-25 RX ORDER — METOPROLOL SUCCINATE 50 MG/1
50 TABLET, EXTENDED RELEASE ORAL 2 TIMES DAILY
Qty: 180 TABLET | Refills: 3 | Status: SHIPPED | OUTPATIENT
Start: 2022-08-25

## 2022-08-26 ENCOUNTER — CLINICAL SUPPORT (OUTPATIENT)
Dept: CARDIAC REHAB | Facility: HOSPITAL | Age: 57
End: 2022-08-26
Payer: COMMERCIAL

## 2022-08-26 DIAGNOSIS — I21.02 STEMI INVOLVING LEFT ANTERIOR DESCENDING CORONARY ARTERY (HCC): ICD-10-CM

## 2022-08-26 PROCEDURE — 93798 PHYS/QHP OP CAR RHAB W/ECG: CPT

## 2022-08-29 ENCOUNTER — CLINICAL SUPPORT (OUTPATIENT)
Dept: CARDIAC REHAB | Facility: HOSPITAL | Age: 57
End: 2022-08-29
Payer: COMMERCIAL

## 2022-08-29 DIAGNOSIS — I21.02 STEMI INVOLVING LEFT ANTERIOR DESCENDING CORONARY ARTERY (HCC): ICD-10-CM

## 2022-08-29 PROCEDURE — 93798 PHYS/QHP OP CAR RHAB W/ECG: CPT

## 2022-08-31 ENCOUNTER — APPOINTMENT (OUTPATIENT)
Dept: CARDIAC REHAB | Facility: HOSPITAL | Age: 57
End: 2022-08-31
Payer: COMMERCIAL

## 2022-08-31 ENCOUNTER — OFFICE VISIT (OUTPATIENT)
Dept: CARDIOLOGY CLINIC | Facility: CLINIC | Age: 57
End: 2022-08-31
Payer: COMMERCIAL

## 2022-08-31 VITALS
HEIGHT: 66 IN | WEIGHT: 180 LBS | BODY MASS INDEX: 28.93 KG/M2 | SYSTOLIC BLOOD PRESSURE: 112 MMHG | HEART RATE: 80 BPM | DIASTOLIC BLOOD PRESSURE: 60 MMHG

## 2022-08-31 DIAGNOSIS — I21.3 STEMI (ST ELEVATION MYOCARDIAL INFARCTION) (HCC): ICD-10-CM

## 2022-08-31 DIAGNOSIS — I25.10 CORONARY ARTERY DISEASE INVOLVING NATIVE CORONARY ARTERY OF NATIVE HEART WITHOUT ANGINA PECTORIS: ICD-10-CM

## 2022-08-31 DIAGNOSIS — Z72.0 TOBACCO ABUSE: Primary | ICD-10-CM

## 2022-08-31 PROCEDURE — 99214 OFFICE O/P EST MOD 30 MIN: CPT | Performed by: NURSE PRACTITIONER

## 2022-08-31 NOTE — ASSESSMENT & PLAN NOTE
Status post BRIAN to mid LAD 7/13/2022, planned intervention of the L PDA 8/12/2022  Continue DAPT (aspirin, Brilinta,) statin, beta-blocker

## 2022-08-31 NOTE — PROGRESS NOTES
Patient ID: Giselle Medina is a 65 y o  male  Plan:      Coronary artery disease involving native coronary artery  Status post BRIAN to mid LAD 7/13/2022, planned intervention of the L PDA 8/12/2022  Continue DAPT (aspirin, Brilinta,) statin, beta-blocker  Tobacco abuse  Patient continues to smoke, however has decreased from 3 packs per day to 1 pack per day  Encouraged complete cessation       Follow up Plan/Summary Comments:  Continue DAPT for 1 year (08/2023)  Continue other cardiac medications  We discussed healthy diet, activity, and smoking cessation  Lillian Steiner will be seen in 3 months for follow-up visit  He will notify us sooner if needed  HPI: I had the pleasure seeing Lillian Steiner in the office today for a hospital follow-up visit  He was electively admitted for a staged PCI/BRIAN of the L PDA  Since his procedure, he has started cardiac rehab and notes that he has been doing well  He denies any chest pain, pressure, tightness, burning, palpitations  He denies any shortness of breath, exertional dyspnea, dizziness, lightheadedness, syncope  We discussed his diet, and he reports that he is still working on this  Unfortunately, he continues to smoke 1 pack a day  Review of Systems   10  point ROS  was otherwise non pertinent or negative except as per HPI or as below  Gait: Normal      Most recent or relevant cardiac/vascular testing:    Echo 07/13/2022 EF 50%, akinesis of the apical anterior, apical septal, apical inferior, apical lateral segments and apex  Cardiac catheterization 07/13/2022    · Mid LAD lesion is 100% stenosed  · LPAV lesion is 90% stenosed  · Prox LAD lesion is 40% stenosed  Severe 2-vessel coronary artery disease with culprit mid LAD lesion which was intervened on  Residual PDA 90% lesion in dominant LCX          Objective:     /60   Pulse 80   Ht 5' 6" (1 676 m)   Wt 81 6 kg (180 lb)   BMI 29 05 kg/m²     PHYSICAL EXAM:    General:  Normal appearance, no acute distress  Eyes:  Anicteric  Oral mucosa:  Moist   Neck:  No JVD  Carotid upstrokes are brisk without bruits  No masses  Chest:  Clear to auscultation   Cardiac:  No palpable PMI  Normal S1 and S2  No murmur gallop or rub  Abdomen:  Soft and nontender  No palpable organomegaly or aortic enlargement  Extremities:  No peripheral edema  Musculoskeletal:  Symmetric  Vascular:  Pedal pulses are intact  Neuro:  Grossly symmetric  Psych:  Alert and oriented x3  No Known Allergies    Current Outpatient Medications:     aspirin (ECOTRIN LOW STRENGTH) 81 mg EC tablet, Take 1 tablet (81 mg total) by mouth daily, Disp: 30 tablet, Rfl: 0    atorvastatin (LIPITOR) 80 mg tablet, Take 1 tablet (80 mg total) by mouth daily with dinner, Disp: 90 tablet, Rfl: 3    metoprolol succinate (TOPROL-XL) 50 mg 24 hr tablet, Take 1 tablet (50 mg total) by mouth 2 (two) times a day, Disp: 180 tablet, Rfl: 3    nitroglycerin (NITROSTAT) 0 4 mg SL tablet, Place 1 tablet (0 4 mg total) under the tongue every 5 (five) minutes as needed for chest pain, Disp: 15 tablet, Rfl: 0    ticagrelor (BRILINTA) 90 MG, Take 1 tablet (90 mg total) by mouth 2 (two) times a day, Disp: 180 tablet, Rfl: 3  Past Medical History:   Diagnosis Date    Acute ST elevation myocardial infarction (STEMI) of anterolateral wall     Coronary artery disease     anterolateral STEMI involving the LAD, status post PCI    Mixed hyperlipidemia      Past Surgical History:   Procedure Laterality Date    CARDIAC CATHETERIZATION N/A 7/13/2022    Procedure: Cardiac pci;  Surgeon: Poly Link MD;  [facility]; Service: Cardiology    CARDIAC CATHETERIZATION N/A 7/13/2022    Procedure: Cardiac Coronary Angiogram;  Surgeon: Poly Link MD;  [facility];   Service: Cardiology    CARDIAC CATHETERIZATION  7/13/2022    Procedure: Cardiac catheterization;  Surgeon: Poly Link MD;  [facility];  Service: Cardiology    CARDIAC CATHETERIZATION N/A 8/12/2022    Procedure: Cardiac pci;  Surgeon: Phuong West MD;  [facility]; Service: Cardiology    CARDIAC CATHETERIZATION N/A 8/12/2022    Procedure: Cardiac Coronary Angiogram;  Surgeon: Phuong West MD;  [facility]; Service: Cardiology    CARDIAC CATHETERIZATION N/A 8/12/2022    Procedure: Cardiac catheterization;  Surgeon: Phuong West MD;  [facility];   Service: Cardiology       CMP:   Lab Results   Component Value Date    K 4 0 08/19/2022    CL 98 08/19/2022    CO2 27 08/19/2022    BUN 18 08/19/2022    CREATININE 1 23 08/19/2022    EGFR 65 08/19/2022     Lipid Profile:    Lab Results   Component Value Date    TRIG 136 07/14/2022    HDL 43 07/14/2022         Social History     Tobacco Use   Smoking Status Current Every Day Smoker    Packs/day: 0 50    Types: Cigarettes   Smokeless Tobacco Never Used

## 2022-08-31 NOTE — ASSESSMENT & PLAN NOTE
Patient continues to smoke, however has decreased from 3 packs per day to 1 pack per day  Encouraged complete cessation

## 2022-09-02 ENCOUNTER — CLINICAL SUPPORT (OUTPATIENT)
Dept: CARDIAC REHAB | Facility: HOSPITAL | Age: 57
End: 2022-09-02
Payer: COMMERCIAL

## 2022-09-02 DIAGNOSIS — I21.02 STEMI INVOLVING LEFT ANTERIOR DESCENDING CORONARY ARTERY (HCC): ICD-10-CM

## 2022-09-02 PROCEDURE — 93798 PHYS/QHP OP CAR RHAB W/ECG: CPT

## 2022-09-07 ENCOUNTER — CLINICAL SUPPORT (OUTPATIENT)
Dept: CARDIAC REHAB | Facility: HOSPITAL | Age: 57
End: 2022-09-07
Payer: COMMERCIAL

## 2022-09-07 DIAGNOSIS — I21.02 STEMI INVOLVING LEFT ANTERIOR DESCENDING CORONARY ARTERY (HCC): ICD-10-CM

## 2022-09-07 PROCEDURE — 93798 PHYS/QHP OP CAR RHAB W/ECG: CPT

## 2022-09-09 ENCOUNTER — CLINICAL SUPPORT (OUTPATIENT)
Dept: CARDIAC REHAB | Facility: HOSPITAL | Age: 57
End: 2022-09-09
Payer: COMMERCIAL

## 2022-09-09 DIAGNOSIS — I21.02 STEMI INVOLVING LEFT ANTERIOR DESCENDING CORONARY ARTERY (HCC): ICD-10-CM

## 2022-09-09 PROCEDURE — 93798 PHYS/QHP OP CAR RHAB W/ECG: CPT

## 2022-09-14 ENCOUNTER — CLINICAL SUPPORT (OUTPATIENT)
Dept: CARDIAC REHAB | Facility: HOSPITAL | Age: 57
End: 2022-09-14
Payer: COMMERCIAL

## 2022-09-14 DIAGNOSIS — I21.02 STEMI INVOLVING LEFT ANTERIOR DESCENDING CORONARY ARTERY (HCC): ICD-10-CM

## 2022-09-14 PROCEDURE — 93798 PHYS/QHP OP CAR RHAB W/ECG: CPT

## 2022-09-16 ENCOUNTER — CLINICAL SUPPORT (OUTPATIENT)
Dept: CARDIAC REHAB | Facility: HOSPITAL | Age: 57
End: 2022-09-16
Payer: COMMERCIAL

## 2022-09-16 DIAGNOSIS — I21.02 STEMI INVOLVING LEFT ANTERIOR DESCENDING CORONARY ARTERY (HCC): Primary | ICD-10-CM

## 2022-09-16 PROCEDURE — 93798 PHYS/QHP OP CAR RHAB W/ECG: CPT

## 2022-09-19 ENCOUNTER — CLINICAL SUPPORT (OUTPATIENT)
Dept: CARDIAC REHAB | Facility: HOSPITAL | Age: 57
End: 2022-09-19
Payer: COMMERCIAL

## 2022-09-19 DIAGNOSIS — I21.02 STEMI INVOLVING LEFT ANTERIOR DESCENDING CORONARY ARTERY (HCC): ICD-10-CM

## 2022-09-19 PROCEDURE — 93798 PHYS/QHP OP CAR RHAB W/ECG: CPT

## 2022-09-19 NOTE — PROGRESS NOTES
Cardiac Rehabilitation Plan of Care   60 Day Reassessment      Today's date: 2022   # of Exercise Sessions Completed:   Patient name: Giselle Medina      : 1965  Age: 65 y o  MRN: [id number]  Referring Physician: Gloria Perry MD  Cardiologist: Violeta Guevara MD  Provider: [facility]  Clinician: Nuvia Hutson MS    Dx: STEMI involving LAD  Date of onset: 22      SUMMARY OF PROGRESS:  Lillian Steiner has attended  sessions of cardiac rehab  Patient is completing about 45 minutes of aerobic exercise with strength training  Resting /64, HR 82 bpm  Exercise /60, -138 bpm  Telemetry reads NSR  Since patient had his last stent placement, his T wave has converted to normal  Patient stated he feels great and is motivated to go to work and has more energy  Patient is working during the day and stated he is getting some walking in but would like to do more on his off days of rehab  Patient is continuing to smoke a pack of cigs per day  Patient is trying to cut back but it is hard when everyone at home smokes with him  Patient was encouraged to contact smoking cessation  Patient is working on making dietary changes such as cutting back sodium and packing healthier choices to work  Patient will continue to receive education appropriate to his needs       Medication compliance: Yes   Comments: Pt reports to be compliant with medications  Fall Risk: Low   Comments: Ambulates with a steady gait with no assist device    EKG Interpretation: NSR w/ inverted T wave      EXERCISE ASSESSMENT and PLAN    Current Exercise Program in Rehab:       Frequency: 3 days/week Supplement with home exercise 2+ days/wk as tolerated       Minutes: 35-40         METS: 3 25-5 50          HR: 117-138 bpm   RPE: 4-6       Modalities: Treadmill, Airdyne bike, UBE, Lifecycle, Elliptical, Rower and NuStep      Exercise Progression 30 Day Goals :    Frequency: 3 days/week of cardiac rehab     Supplement with home exercise 2+ days/wk as tolerated    Minutes: 45-50                          >150 mins/wk of moderate intensity exercise   METS: 4 5-5 5   HR: 115-130 bpm   RPE: 3-6   Modalities: Treadmill, Airdyne bike, UBE, Lifecycle, Elliptical, Rower, NuStep and Recumbent bike    Strength trainin-3 days / week   Modalities: Chest Press, Pull Downs and free weights    Home Exercise: Type: walking 1/2 mile daily     Goals: 10% improvement in functional capacity - based on max METs achieved in fitness assessment, Resume ADLs with increased strength, Return to work unrestricted, Attend Rehab regularly, Decrease sitting time and Start a walking program    Progression Toward Goals:  Reviewed Pt goals and determined plan of care, Patient will initiate setting walking goals weekly (aim for 1 mile) in the next 30 days    Education: benefit of exercise for CAD risk factors, home exercise guidelines, AHA guidelines to achieve >150 mins/wk of moderate exercise, RPE scale and class: Risk Factors for Heart Disease   Plan:education on home exercise guidelines, home exercise 30+ mins 2 days opposite CR and Education class: Risk Factors for Heart Disease  Readiness to change: Action:  (Changing behavior)      NUTRITION ASSESSMENT AND PLAN    Weight control:    Starting weight: 185   Current weight:   185    Diabetes: N/A  A1c: 5 7    last measured: 22    Lipid management: Last lipid profile 22  Chol 194    HDL 43      Goals:LDL <100, HDL >40, TRG <150, CHOL <200, eliminate processed meats, eat 3 or more servings of whole grains a day, Eat 4-5 cups of fruits and vegetables daily, use olive or canola oil in baking, choose low sodium processed foods, eliminate butter and choose healthy snacks: light popcorn, plain pretzels    Progression Toward Goals: Reviewed Pt goals and determined plan of care, Patient will make dietary changes to see improvements in lipid panel and A1C in the next 30 days    Education: heart healthy eating  hydration  nutrition for  lipid management  wt  loss   healthy choices while dining out  portion control  Plan: replace butter with soft spreads made with olive oil, canola or yogurt, replace refined grain bread with whole grain bread, replace unhealthy snacks with fruits & vegs, reduce portion sizes, eat fewer desserts and sweets, avoid processed foods, drink more water and learn how to read food labels  Readiness to change: Action:  (Changing behavior)      PSYCHOSOCIAL ASSESSMENT AND PLAN    Emotional:  Depression assessment:  PHQ-9 = 1-4 = Minimal Depression            Anxiety measure:  JOHNNY-7 = 0-4  = Not anxious  Self-reported stress level:  6  Social support: Very Good    Goals:  Reduce perceived stress to 1-3/10, Physical Fitness in [facility] Score < 3, Daily Activity in [facility] Score < 3, Increased interest in doing things, improved positive thoughts of well being and increased energy    Progression Toward Goals: Reviewed Pt goals and determined plan of care, Patient will enjoy hobbies to help relieve stress from work, spending time outside, watch TV  in the next 30 days    Education: signs/sxs of depression, benefits of a positive support system, stress management techniques and depression and CAD  Plan: Exercise, Spend time outdoors, Enjoy a hobby, Keep a positive mindset, Meet new people and Enjoy family  Readiness to change: Action:  (Changing behavior)      OTHER CORE COMPONENTS     Tobacco:   Social History     Tobacco Use   Smoking Status Current Every Day Smoker    Packs/day: 0 50    Types: Cigarettes   Smokeless Tobacco Never Used       Tobacco Use Intervention:   Pt continues to smoke 6/8 cigs/day   Pt is not ready to quit  Suggested Pt enroll in [facility] Smoking Cessation Program    Anginal Symptoms:  chest pressure and SOB   NTG use: Compliant with carrying NTG, Understands proper use, Reviewed Proper use and Pt has not used NTG since event    Blood pressure:    Resting: 100/64   Exercise: 140/60   Recovery: 100/62    Goals: consistent BP < 130/80, reduced dietary sodium <2300mg, moderate intensity exercise >150 mins/wk, medication compliance, reduce number of cigarettes/day, set a target quit date and make contact with a tobacco use prevention program    Progression Toward Goals: Reviewed Pt goals and determined plan of care, Patient will reduce the number cigs  in the next 30 days    Education:  understanding high blood pressure and it's relationship to CAD, low sodium diet and HTN, proper use of sublingual NTG, smoking and heart disease, tobacco triggers and setting a smoking cessation plan  Plan: Class: Common Heart Medications, Set target quit date for smoking, reduce number of cigarettes per day, avoid places with second hand smoke, Avoid Processed foods, engage in regular exercise, eliminate salt shaker at the table and use salt substitutes  Readiness to change: Action:  (Changing behavior)

## 2022-09-21 ENCOUNTER — CLINICAL SUPPORT (OUTPATIENT)
Dept: CARDIAC REHAB | Facility: HOSPITAL | Age: 57
End: 2022-09-21
Payer: COMMERCIAL

## 2022-09-21 DIAGNOSIS — I21.02 STEMI INVOLVING LEFT ANTERIOR DESCENDING CORONARY ARTERY (HCC): ICD-10-CM

## 2022-09-21 PROCEDURE — 93798 PHYS/QHP OP CAR RHAB W/ECG: CPT

## 2022-09-23 ENCOUNTER — CLINICAL SUPPORT (OUTPATIENT)
Dept: CARDIAC REHAB | Facility: HOSPITAL | Age: 57
End: 2022-09-23
Payer: COMMERCIAL

## 2022-09-23 DIAGNOSIS — I21.02 STEMI INVOLVING LEFT ANTERIOR DESCENDING CORONARY ARTERY (HCC): ICD-10-CM

## 2022-09-23 PROCEDURE — 93798 PHYS/QHP OP CAR RHAB W/ECG: CPT

## 2022-09-26 ENCOUNTER — CLINICAL SUPPORT (OUTPATIENT)
Dept: CARDIAC REHAB | Facility: HOSPITAL | Age: 57
End: 2022-09-26
Payer: COMMERCIAL

## 2022-09-26 DIAGNOSIS — I21.02 STEMI INVOLVING LEFT ANTERIOR DESCENDING CORONARY ARTERY (HCC): ICD-10-CM

## 2022-09-26 PROCEDURE — 93798 PHYS/QHP OP CAR RHAB W/ECG: CPT

## 2022-09-30 ENCOUNTER — CLINICAL SUPPORT (OUTPATIENT)
Dept: CARDIAC REHAB | Facility: HOSPITAL | Age: 57
End: 2022-09-30
Payer: COMMERCIAL

## 2022-09-30 DIAGNOSIS — I21.02 STEMI INVOLVING LEFT ANTERIOR DESCENDING CORONARY ARTERY (HCC): ICD-10-CM

## 2022-09-30 PROCEDURE — 93798 PHYS/QHP OP CAR RHAB W/ECG: CPT

## 2022-10-03 ENCOUNTER — CLINICAL SUPPORT (OUTPATIENT)
Dept: CARDIAC REHAB | Facility: HOSPITAL | Age: 57
End: 2022-10-03
Payer: COMMERCIAL

## 2022-10-03 DIAGNOSIS — I21.02 STEMI INVOLVING LEFT ANTERIOR DESCENDING CORONARY ARTERY (HCC): Primary | ICD-10-CM

## 2022-10-03 PROCEDURE — 93798 PHYS/QHP OP CAR RHAB W/ECG: CPT

## 2022-10-07 ENCOUNTER — TELEPHONE (OUTPATIENT)
Dept: PULMONOLOGY | Facility: HOSPITAL | Age: 57
End: 2022-10-07

## 2022-10-07 ENCOUNTER — APPOINTMENT (OUTPATIENT)
Dept: CARDIAC REHAB | Facility: HOSPITAL | Age: 57
End: 2022-10-07

## 2022-10-10 ENCOUNTER — APPOINTMENT (OUTPATIENT)
Dept: CARDIAC REHAB | Facility: HOSPITAL | Age: 57
End: 2022-10-10

## 2022-10-12 ENCOUNTER — APPOINTMENT (OUTPATIENT)
Dept: CARDIAC REHAB | Facility: HOSPITAL | Age: 57
End: 2022-10-12

## 2022-10-12 NOTE — PROGRESS NOTES
Cardiac Rehabilitation Plan of Care   90 Day Reassessment      Today's date: 10/12/2022   # of Exercise Sessions Completed:   Patient name: Elvira Tellez      : 1965  Age: 65 y o  MRN: [id number]  Referring Physician: Maksim العلي MD  Cardiologist: Juan Osborn MD  Provider: [facility]  Clinician: Krystal Lui MS    Dx: STEMI involving LAD  Date of onset: 22      SUMMARY OF PROGRESS:  Tito Hwang has attended  sessions of cardiac rehab  Patient is completing about 45 minutes of aerobic exercise with strength training  Resting /68, HR 85 bpm  Exercise /64, HR  bpm   Telemetry reads NSR  Patient unfortunately was laid off from his job and has lost his insurance  Patient asked if he could be put on hold for the mean time to see if he can acquire insurance  Patient has been showing great improvement since his last procedure  Patient continues to smoke and is being encouraged to quit  Patient does light waking during the day  Patient has been making dietary changes, such as cutting back on sugar and sodium  We hope to see this patient return soon to complete all sessions           Medication compliance: Yes   Comments: Pt reports to be compliant with medications  Fall Risk: Low   Comments: Ambulates with a steady gait with no assist device    EKG Interpretation: NSR w/ inverted T wave      EXERCISE ASSESSMENT and PLAN    Current Exercise Program in Rehab:       Frequency: 3 days/week Supplement with home exercise 2+ days/wk as tolerated       Minutes: 45-50       METS: 3 97-5 07          HR:  bpm   RPE: 4-6       Modalities: Treadmill, Airdyne bike, UBE, Lifecycle, Elliptical, Rower and NuStep      Exercise Progression 30 Day Goals :    Frequency: 3 days/week of cardiac rehab     Supplement with home exercise 2+ days/wk as tolerated    Minutes: 45-50                          >150 mins/wk of moderate intensity exercise   METS: 4 5-5 5   HR: 110-130 bpm   RPE: 3-6   Modalities: Treadmill, Airdyne bike, UBE, Lifecycle, Elliptical, Rower, NuStep and Recumbent bike    Strength trainin-3 days / week   Modalities: Chest Press, [employer] and free weights    Home Exercise: Type: walking 1/2 mile daily     Goals: 10% improvement in functional capacity - based on max METs achieved in fitness assessment, Resume ADLs with increased strength, Return to work unrestricted, Attend Rehab regularly, Decrease sitting time and Start a walking program    Progression Toward Goals:  Reviewed Pt goals and determined plan of care, Patient will initiate setting walking goals weekly (aim for 1 mile) in the next 30 days    Education: benefit of exercise for CAD risk factors, home exercise guidelines, AHA guidelines to achieve >150 mins/wk of moderate exercise, RPE scale and class: Risk Factors for Heart Disease   Plan:education on home exercise guidelines, home exercise 30+ mins 2 days opposite CR and Education class: Risk Factors for Heart Disease  Readiness to change: Action:  (Changing behavior)      NUTRITION ASSESSMENT AND PLAN    Weight control:    Starting weight: 185   Current weight:   182    Diabetes: N/A  A1c: 5 7    last measured: 22    Lipid management: Last lipid profile 22  Chol 194    HDL 43      Goals:LDL <100, HDL >40, TRG <150, CHOL <200, eliminate processed meats, eat 3 or more servings of whole grains a day, Eat 4-5 cups of fruits and vegetables daily, use olive or canola oil in baking, choose low sodium processed foods, eliminate butter and choose healthy snacks: light popcorn, plain pretzels    Progression Toward Goals: Reviewed Pt goals and determined plan of care, Patient will make dietary changes to see improvements in lipid panel and A1C in the next 30 days    Education: heart healthy eating  hydration  nutrition for  lipid management  wt  loss   healthy choices while dining out  portion control  Plan: replace butter with soft spreads made with olive oil, canola or yogurt, replace refined grain bread with whole grain bread, replace unhealthy snacks with fruits & vegs, reduce portion sizes, eat fewer desserts and sweets, avoid processed foods, drink more water and learn how to read food labels  Readiness to change: Action:  (Changing behavior)      PSYCHOSOCIAL ASSESSMENT AND PLAN    Emotional:  Depression assessment:  PHQ-9 = 1-4 = Minimal Depression            Anxiety measure:  JOHNNY-7 = 0-4  = Not anxious  Self-reported stress level:  6  Social support: Very Good    Goals:  Reduce perceived stress to 1-3/10, Physical Fitness in [facility] Score < 3, Daily Activity in [facility] Score < 3, Increased interest in doing things, improved positive thoughts of well being and increased energy    Progression Toward Goals: Reviewed Pt goals and determined plan of care, Patient will enjoy hobbies to help relieve stress from work, spending time outside, watch TV  in the next 30 days    Education: signs/sxs of depression, benefits of a positive support system, stress management techniques and depression and CAD  Plan: Exercise, Spend time outdoors, Enjoy a hobby, Keep a positive mindset, Meet new people and Enjoy family  Readiness to change: Action:  (Changing behavior)      OTHER CORE COMPONENTS     Tobacco:   Social History     Tobacco Use   Smoking Status Current Every Day Smoker   • Packs/day: 0 50   • Types: Cigarettes   Smokeless Tobacco Never Used       Tobacco Use Intervention:   Pt continues to smoke 6/8 cigs/day   Pt is not ready to quit  Suggested Pt enroll in [facility] Smoking Cessation Program    Anginal Symptoms:  chest pressure and SOB   NTG use: Compliant with carrying NTG, Understands proper use, Reviewed Proper use and Pt has not used NTG since event    Blood pressure:    Restin/68   Exercise: 126/64   Recovery: 108/68    Goals: consistent BP < 130/80, reduced dietary sodium <2300mg, moderate intensity exercise >150 mins/wk, medication compliance, reduce number of cigarettes/day, set a target quit date and make contact with a tobacco use prevention program    Progression Toward Goals: Reviewed Pt goals and determined plan of care, Patient will reduce the number cigs  in the next 30 days    Education:  understanding high blood pressure and it's relationship to CAD, low sodium diet and HTN, proper use of sublingual NTG, smoking and heart disease, tobacco triggers and setting a smoking cessation plan  Plan: Class: Common Heart Medications, Set target quit date for smoking, reduce number of cigarettes per day, avoid places with second hand smoke, Avoid Processed foods, engage in regular exercise, eliminate salt shaker at the table and use salt substitutes  Readiness to change: Action:  (Changing behavior)

## 2022-10-14 ENCOUNTER — APPOINTMENT (OUTPATIENT)
Dept: CARDIAC REHAB | Facility: HOSPITAL | Age: 57
End: 2022-10-14

## 2022-10-17 ENCOUNTER — APPOINTMENT (OUTPATIENT)
Dept: CARDIAC REHAB | Facility: HOSPITAL | Age: 57
End: 2022-10-17

## 2022-10-19 ENCOUNTER — APPOINTMENT (OUTPATIENT)
Dept: CARDIAC REHAB | Facility: HOSPITAL | Age: 57
End: 2022-10-19

## 2022-11-07 ENCOUNTER — TELEPHONE (OUTPATIENT)
Dept: PULMONOLOGY | Facility: HOSPITAL | Age: 57
End: 2022-11-07

## 2022-11-30 ENCOUNTER — OFFICE VISIT (OUTPATIENT)
Dept: CARDIOLOGY CLINIC | Facility: CLINIC | Age: 57
End: 2022-11-30

## 2022-11-30 VITALS
SYSTOLIC BLOOD PRESSURE: 140 MMHG | DIASTOLIC BLOOD PRESSURE: 100 MMHG | HEART RATE: 80 BPM | HEIGHT: 66 IN | BODY MASS INDEX: 31.18 KG/M2 | WEIGHT: 194 LBS

## 2022-11-30 DIAGNOSIS — Z72.0 TOBACCO ABUSE: ICD-10-CM

## 2022-11-30 DIAGNOSIS — I21.3 STEMI (ST ELEVATION MYOCARDIAL INFARCTION) (HCC): ICD-10-CM

## 2022-11-30 DIAGNOSIS — I21.02 STEMI INVOLVING LEFT ANTERIOR DESCENDING CORONARY ARTERY (HCC): Primary | ICD-10-CM

## 2022-11-30 DIAGNOSIS — I25.10 CORONARY ARTERY DISEASE INVOLVING NATIVE CORONARY ARTERY OF NATIVE HEART WITHOUT ANGINA PECTORIS: ICD-10-CM

## 2022-11-30 RX ORDER — ATORVASTATIN CALCIUM 80 MG/1
80 TABLET, FILM COATED ORAL
Qty: 90 TABLET | Refills: 3 | Status: SHIPPED | OUTPATIENT
Start: 2022-11-30

## 2022-11-30 RX ORDER — METOPROLOL SUCCINATE 50 MG/1
50 TABLET, EXTENDED RELEASE ORAL 2 TIMES DAILY
Qty: 180 TABLET | Refills: 3 | Status: SHIPPED | OUTPATIENT
Start: 2022-11-30

## 2022-11-30 NOTE — ASSESSMENT & PLAN NOTE
Status post BRIAN to mid LAD 7/13/2022, staged intervention to PDA (8/12/2022)  Continue DAPT (aspirin, Brilinta,) statin, beta-blocker

## 2022-11-30 NOTE — ASSESSMENT & PLAN NOTE
Status post BRIAN to mid LAD 7/13/2022, and staged PCI of the L PDA 8/12/2022  Continue DAPT (aspirin, Brilinta,) statin, beta-blocker

## 2022-11-30 NOTE — PROGRESS NOTES
Patient ID: Lalito Arellano is a 65 y o  male  Plan:      STEMI involving left anterior descending coronary artery (HCC)  Status post BRIAN to mid LAD 7/13/2022, staged intervention to PDA (8/12/2022)  Continue DAPT (aspirin, Brilinta,) statin, beta-blocker    Coronary artery disease involving native coronary artery  Status post BRIAN to mid LAD 7/13/2022, and staged PCI of the L PDA 8/12/2022  Continue DAPT (aspirin, Brilinta,) statin, beta-blocker  Tobacco abuse  Pt states he has quit smoking        Follow up Plan/Summary Comments:  Blood pressure is noted to be slightly elevated today  We will continue to monitor for now  No medication changes have been made  No indication for cardiac testing at this time  Follow-up in 6-8 months  He will notify us sooner if needed  HPI:  Bruna Posey returned today for routine follow-up  Since his last visit, he completed several months of cardiac rehab  Unfortunately, his car broke down and he was unable to get to rehab  He denies any chest pain or pressure, shortness of breath, dizziness, lightheadedness, syncope  Bruna Posey unfortunately lost his job due to lack of transportation  Lack of income has forced him to quit smoking  He continues to make small improvements in his diet  Cardiac history significant for CAD for which he underwent staged interventions of the mid LAD (07/13/2022) and LPDA (08/12/2022)  Review of Systems   10  point ROS  was otherwise non pertinent or negative except as per HPI or as below  Gait: Normal      Most recent or relevant cardiac/vascular testing:    Echo 07/13/2022 EF 50%, akinesis of the apical anterior, apical septal, apical inferior, apical lateral segments and apex  Cardiac catheterization 07/13/2022    · Mid LAD lesion is 100% stenosed  · LPAV lesion is 90% stenosed  · Prox LAD lesion is 40% stenosed       Severe 2-vessel coronary artery disease with culprit mid LAD lesion which was intervened on   Residual PDA 90% lesion in dominant LCX  Objective:     /100   Pulse 80   Ht 5' 6" (1 676 m)   Wt 88 kg (194 lb)   BMI 31 31 kg/m²     PHYSICAL EXAM:  BP recheck 140/90    General:  Normal appearance, no acute distress  Eyes:  Anicteric  Oral mucosa:  Moist   Neck:  No JVD  Carotid upstrokes are brisk without bruits  No masses  Chest:  Clear to auscultation   Cardiac:  No palpable PMI  Normal S1 and S2  No murmur gallop or rub  Abdomen:  Soft and nontender  No palpable organomegaly or aortic enlargement  Extremities:  No peripheral edema  Musculoskeletal:  Symmetric  Vascular:  Pedal pulses are intact  Neuro:  Grossly symmetric  Psych:  Alert and oriented x3  No Known Allergies    Current Outpatient Medications:   •  aspirin (ECOTRIN LOW STRENGTH) 81 mg EC tablet, Take 1 tablet (81 mg total) by mouth daily, Disp: 30 tablet, Rfl: 0  •  atorvastatin (LIPITOR) 80 mg tablet, Take 1 tablet (80 mg total) by mouth daily with dinner, Disp: 90 tablet, Rfl: 3  •  metoprolol succinate (TOPROL-XL) 50 mg 24 hr tablet, Take 1 tablet (50 mg total) by mouth 2 (two) times a day, Disp: 180 tablet, Rfl: 3  •  nitroglycerin (NITROSTAT) 0 4 mg SL tablet, Place 1 tablet (0 4 mg total) under the tongue every 5 (five) minutes as needed for chest pain, Disp: 15 tablet, Rfl: 0  •  ticagrelor (BRILINTA) 90 MG, Take 1 tablet (90 mg total) by mouth 2 (two) times a day, Disp: 180 tablet, Rfl: 3  Past Medical History:   Diagnosis Date   • Acute ST elevation myocardial infarction (STEMI) of anterolateral wall    • Coronary artery disease     anterolateral STEMI involving the LAD, status post PCI   • Mixed hyperlipidemia      Past Surgical History:   Procedure Laterality Date   • CARDIAC CATHETERIZATION N/A 7/13/2022    Procedure: Cardiac pci;  Surgeon: Lucho Noonan MD;  [facility];   Service: Cardiology   • CARDIAC CATHETERIZATION N/A 7/13/2022    Procedure: Cardiac Coronary Angiogram; Surgeon: Dayna Vital MD;  [facility]; Service: Cardiology   • CARDIAC CATHETERIZATION  7/13/2022    Procedure: Cardiac catheterization;  Surgeon: Dayna Vital MD;  [facility]; Service: Cardiology   • CARDIAC CATHETERIZATION N/A 8/12/2022    Procedure: Cardiac pci;  Surgeon: Dayna Vital MD;  [facility]; Service: Cardiology   • CARDIAC CATHETERIZATION N/A 8/12/2022    Procedure: Cardiac Coronary Angiogram;  Surgeon: Dayna Vital MD;  [facility]; Service: Cardiology   • CARDIAC CATHETERIZATION N/A 8/12/2022    Procedure: Cardiac catheterization;  Surgeon: Dayna Vital MD;  [facility];   Service: Cardiology       CMP:   Lab Results   Component Value Date    K 4 0 08/19/2022    CL 98 08/19/2022    CO2 27 08/19/2022    BUN 18 08/19/2022    CREATININE 1 23 08/19/2022    EGFR 65 08/19/2022     Lipid Profile:    Lab Results   Component Value Date    TRIG 136 07/14/2022    HDL 43 07/14/2022         Social History     Tobacco Use   Smoking Status Every Day   • Packs/day: 0 50   • Types: Cigarettes   Smokeless Tobacco Never

## 2023-07-05 ENCOUNTER — OFFICE VISIT (OUTPATIENT)
Dept: CARDIOLOGY CLINIC | Facility: CLINIC | Age: 58
End: 2023-07-05
Payer: COMMERCIAL

## 2023-07-05 VITALS
HEART RATE: 73 BPM | SYSTOLIC BLOOD PRESSURE: 126 MMHG | BODY MASS INDEX: 28.93 KG/M2 | WEIGHT: 180 LBS | HEIGHT: 66 IN | DIASTOLIC BLOOD PRESSURE: 80 MMHG

## 2023-07-05 DIAGNOSIS — I25.10 CORONARY ARTERY DISEASE INVOLVING NATIVE CORONARY ARTERY OF NATIVE HEART WITHOUT ANGINA PECTORIS: Primary | ICD-10-CM

## 2023-07-05 DIAGNOSIS — Z72.0 TOBACCO ABUSE: ICD-10-CM

## 2023-07-05 PROCEDURE — 99214 OFFICE O/P EST MOD 30 MIN: CPT | Performed by: NURSE PRACTITIONER

## 2023-07-05 PROCEDURE — 93000 ELECTROCARDIOGRAM COMPLETE: CPT | Performed by: NURSE PRACTITIONER

## 2023-07-05 NOTE — ASSESSMENT & PLAN NOTE
Status post BRIAN to mid LAD 7/13/2022, and staged PCI of the L PDA 8/12/2022  Continue DAPT (aspirin, Brilinta,) statin, beta-blocker.   Discontinue Brilinta after 8/12/2023

## 2023-07-05 NOTE — PROGRESS NOTES
Patient ID: Sidra Rodriguez is a 63 y.o. male. Plan:      Coronary artery disease involving native coronary artery  Status post BRIAN to mid LAD 7/13/2022, and staged PCI of the L PDA 8/12/2022  Continue DAPT (aspirin, Brilinta,) statin, beta-blocker. Discontinue Brilinta after 8/12/2023    Tobacco abuse  Down to half a pack a day, complete cessation encouraged       Follow up Plan/Summary Comments:  As we approach the 1 year crystal of his PCI/stent, we discussed discontinuing Brilinta. Cesario Mo will continue with DAPT through 8/12/2023. No other medication changes were made today. No indication for testing at this time. Follow-up in 1 year or sooner if needed. HPI: Cesario Mo is seen in the office today for routine visit. He underwent staged intervention of the mid LAD and L PDA on 7/13/2022 and 8/12/2022. He completed most of his cardiac rehab sessions, but unfortunately lost his benefits and was not able to complete the program.    He has been staying active at home and denies any symptoms suggestive of angina. He denies shortness of breath, palpitations, dizziness, lightheadedness, syncope. He is trying to eat a better diet and has eliminated soda. He is also working to quit smoking and is down to half a pack a day. Review of Systems   10  point ROS  was otherwise non pertinent or negative except as per HPI or as below. Gait: Normal      Most recent or relevant cardiac/vascular testing:    Echo 07/13/2022 EF 50%, akinesis of the apical anterior, apical septal, apical inferior, apical lateral segments and apex. Cardiac catheterization 07/13/2022    · Mid LAD lesion is 100% stenosed. · LPAV lesion is 90% stenosed. · Prox LAD lesion is 40% stenosed. Severe 2-vessel coronary artery disease with culprit mid LAD lesion which was intervened on. Residual PDA 90% lesion in dominant LCX.     Results for orders placed or performed in visit on 07/05/23   POCT ECG    Impression    SR, prior anteroseptal infarct, unchanged from prior tracing       Objective:     /80   Pulse 73   Ht 5' 6" (1.676 m)   Wt 81.6 kg (180 lb)   BMI 29.05 kg/m²     PHYSICAL EXAM:    General:  Normal appearance, no acute distress  Eyes:  Anicteric. Oral mucosa:  Moist.  Neck:  No JVD. Carotid upstrokes are brisk without bruits. No masses. Chest:  Clear to auscultation   Cardiac:  No palpable PMI. Normal S1 and S2. No murmur gallop or rub. Abdomen:  Soft and nontender. No palpable organomegaly or aortic enlargement. Extremities:  No peripheral edema. Musculoskeletal:  Symmetric. Vascular:  Pedal pulses are intact. Neuro:  Grossly symmetric. Psych:  Alert and oriented x3. No Known Allergies    Current Outpatient Medications:   •  aspirin (ECOTRIN LOW STRENGTH) 81 mg EC tablet, Take 1 tablet (81 mg total) by mouth daily, Disp: 30 tablet, Rfl: 0  •  atorvastatin (LIPITOR) 80 mg tablet, Take 1 tablet (80 mg total) by mouth daily with dinner, Disp: 90 tablet, Rfl: 3  •  metoprolol succinate (TOPROL-XL) 50 mg 24 hr tablet, Take 1 tablet (50 mg total) by mouth 2 (two) times a day, Disp: 180 tablet, Rfl: 3  •  nitroglycerin (NITROSTAT) 0.4 mg SL tablet, Place 1 tablet (0.4 mg total) under the tongue every 5 (five) minutes as needed for chest pain, Disp: 15 tablet, Rfl: 0  •  ticagrelor (BRILINTA) 90 MG, Take 1 tablet (90 mg total) by mouth 2 (two) times a day, Disp: 180 tablet, Rfl: 3  Past Medical History:   Diagnosis Date   • Acute ST elevation myocardial infarction (STEMI) of anterolateral wall    • Coronary artery disease     anterolateral STEMI involving the LAD, status post PCI   • Mixed hyperlipidemia      Past Surgical History:   Procedure Laterality Date   • CARDIAC CATHETERIZATION N/A 7/13/2022    Procedure: Cardiac pci;  Surgeon: Danisha Lemus MD;  [facility];   Service: Cardiology   • CARDIAC CATHETERIZATION N/A 7/13/2022    Procedure: Cardiac Coronary Angiogram;  Surgeon: Yareli Marte Veronica Miranda MD;  [facility]; Service: Cardiology   • CARDIAC CATHETERIZATION  7/13/2022    Procedure: Cardiac catheterization;  Surgeon: Wendee Galeazzi, MD;  [facility]; Service: Cardiology   • CARDIAC CATHETERIZATION N/A 8/12/2022    Procedure: Cardiac pci;  Surgeon: Wendee Galeazzi, MD;  [facility]; Service: Cardiology   • CARDIAC CATHETERIZATION N/A 8/12/2022    Procedure: Cardiac Coronary Angiogram;  Surgeon: Wendee Galeazzi, MD;  [facility]; Service: Cardiology   • CARDIAC CATHETERIZATION N/A 8/12/2022    Procedure: Cardiac catheterization;  Surgeon: Wendee Galeazzi, MD;  [facility];   Service: Cardiology       CMP:   Lab Results   Component Value Date    K 4.0 08/19/2022    CL 98 08/19/2022    CO2 27 08/19/2022    BUN 18 08/19/2022    CREATININE 1.23 08/19/2022    EGFR 65 08/19/2022     Lipid Profile:    Lab Results   Component Value Date    TRIG 136 07/14/2022    HDL 43 07/14/2022         Social History     Tobacco Use   Smoking Status Every Day   • Packs/day: 0.50   • Types: Cigarettes   Smokeless Tobacco Never

## 2023-12-24 DIAGNOSIS — I21.3 STEMI (ST ELEVATION MYOCARDIAL INFARCTION) (HCC): ICD-10-CM

## 2023-12-26 RX ORDER — ATORVASTATIN CALCIUM 80 MG/1
80 TABLET, FILM COATED ORAL
Qty: 90 TABLET | Refills: 3 | Status: SHIPPED | OUTPATIENT
Start: 2023-12-26

## 2023-12-26 RX ORDER — METOPROLOL SUCCINATE 50 MG/1
50 TABLET, EXTENDED RELEASE ORAL 2 TIMES DAILY
Qty: 180 TABLET | Refills: 3 | Status: SHIPPED | OUTPATIENT
Start: 2023-12-26

## 2024-09-03 ENCOUNTER — TELEPHONE (OUTPATIENT)
Dept: CARDIOLOGY CLINIC | Facility: CLINIC | Age: 59
End: 2024-09-03

## 2024-09-03 NOTE — TELEPHONE ENCOUNTER
Patient was no show for his office visit today.   I left a message on his voicemail to call the office to reschedule it.

## 2025-06-20 ENCOUNTER — HOSPITAL ENCOUNTER (EMERGENCY)
Facility: HOSPITAL | Age: 60
Discharge: HOME | End: 2025-06-20
Attending: EMERGENCY MEDICINE

## 2025-06-20 ENCOUNTER — APPOINTMENT (EMERGENCY)
Dept: RADIOLOGY | Facility: HOSPITAL | Age: 60
End: 2025-06-20

## 2025-06-20 VITALS
BODY MASS INDEX: 28.93 KG/M2 | DIASTOLIC BLOOD PRESSURE: 89 MMHG | WEIGHT: 180 LBS | HEIGHT: 66 IN | OXYGEN SATURATION: 96 % | SYSTOLIC BLOOD PRESSURE: 176 MMHG | RESPIRATION RATE: 20 BRPM | TEMPERATURE: 98 F | HEART RATE: 78 BPM

## 2025-06-20 DIAGNOSIS — R07.9 CHEST PAIN, UNSPECIFIED: Primary | ICD-10-CM

## 2025-06-20 LAB
2HR DELTA HS TROPONIN: -6 NG/L
ANION GAP SERPL CALCULATED.3IONS-SCNC: 10 MMOL/L (ref 4–13)
ATRIAL RATE: 82 BPM
ATRIAL RATE: 93 BPM
BASOPHILS # BLD AUTO: 0.03 THOUSANDS/ÂΜL (ref 0–0.1)
BASOPHILS NFR BLD AUTO: 0 % (ref 0–1)
BUN SERPL-MCNC: 12 MG/DL (ref 5–25)
CALCIUM SERPL-MCNC: 9.4 MG/DL (ref 8.4–10.2)
CARDIAC TROPONIN I PNL SERPL HS: 3 NG/L (ref ?–50)
CARDIAC TROPONIN I PNL SERPL HS: 9 NG/L (ref ?–50)
CHLORIDE SERPL-SCNC: 104 MMOL/L (ref 96–108)
CO2 SERPL-SCNC: 24 MMOL/L (ref 21–32)
CREAT SERPL-MCNC: 1.07 MG/DL (ref 0.6–1.3)
D DIMER PPP FEU-MCNC: 0.52 UG/ML FEU
EOSINOPHIL # BLD AUTO: 0.15 THOUSAND/ÂΜL (ref 0–0.61)
EOSINOPHIL NFR BLD AUTO: 2 % (ref 0–6)
ERYTHROCYTE [DISTWIDTH] IN BLOOD BY AUTOMATED COUNT: 13.1 % (ref 11.6–15.1)
GFR SERPL CREATININE-BSD FRML MDRD: 75 ML/MIN/1.73SQ M
GLUCOSE SERPL-MCNC: 122 MG/DL (ref 65–140)
HCT VFR BLD AUTO: 47.5 % (ref 36.5–49.3)
HGB BLD-MCNC: 15.9 G/DL (ref 12–17)
IMM GRANULOCYTES # BLD AUTO: 0.01 THOUSAND/UL (ref 0–0.2)
IMM GRANULOCYTES NFR BLD AUTO: 0 % (ref 0–2)
LYMPHOCYTES # BLD AUTO: 1.69 THOUSANDS/ÂΜL (ref 0.6–4.47)
LYMPHOCYTES NFR BLD AUTO: 24 % (ref 14–44)
MCH RBC QN AUTO: 30.4 PG (ref 26.8–34.3)
MCHC RBC AUTO-ENTMCNC: 33.5 G/DL (ref 31.4–37.4)
MCV RBC AUTO: 91 FL (ref 82–98)
MONOCYTES # BLD AUTO: 0.66 THOUSAND/ÂΜL (ref 0.17–1.22)
MONOCYTES NFR BLD AUTO: 10 % (ref 4–12)
NEUTROPHILS # BLD AUTO: 4.38 THOUSANDS/ÂΜL (ref 1.85–7.62)
NEUTS SEG NFR BLD AUTO: 64 % (ref 43–75)
NRBC BLD AUTO-RTO: 0 /100 WBCS
P AXIS: 62 DEGREES
P AXIS: 64 DEGREES
PLATELET # BLD AUTO: 291 THOUSANDS/UL (ref 149–390)
PMV BLD AUTO: 8.5 FL (ref 8.9–12.7)
POTASSIUM SERPL-SCNC: 3.8 MMOL/L (ref 3.5–5.3)
PR INTERVAL: 122 MS
PR INTERVAL: 132 MS
QRS AXIS: -44 DEGREES
QRS AXIS: -48 DEGREES
QRSD INTERVAL: 86 MS
QRSD INTERVAL: 90 MS
QT INTERVAL: 378 MS
QT INTERVAL: 378 MS
QTC INTERVAL: 441 MS
QTC INTERVAL: 469 MS
RBC # BLD AUTO: 5.23 MILLION/UL (ref 3.88–5.62)
SODIUM SERPL-SCNC: 138 MMOL/L (ref 135–147)
T WAVE AXIS: 73 DEGREES
T WAVE AXIS: 79 DEGREES
VENTRICULAR RATE: 82 BPM
VENTRICULAR RATE: 93 BPM
WBC # BLD AUTO: 6.92 THOUSAND/UL (ref 4.31–10.16)

## 2025-06-20 PROCEDURE — 71046 X-RAY EXAM CHEST 2 VIEWS: CPT

## 2025-06-20 PROCEDURE — 80048 BASIC METABOLIC PNL TOTAL CA: CPT | Performed by: EMERGENCY MEDICINE

## 2025-06-20 PROCEDURE — 99285 EMERGENCY DEPT VISIT HI MDM: CPT

## 2025-06-20 PROCEDURE — 85025 COMPLETE CBC W/AUTO DIFF WBC: CPT | Performed by: EMERGENCY MEDICINE

## 2025-06-20 PROCEDURE — 84484 ASSAY OF TROPONIN QUANT: CPT | Performed by: EMERGENCY MEDICINE

## 2025-06-20 PROCEDURE — 93010 ELECTROCARDIOGRAM REPORT: CPT | Performed by: INTERNAL MEDICINE

## 2025-06-20 PROCEDURE — 36415 COLL VENOUS BLD VENIPUNCTURE: CPT | Performed by: EMERGENCY MEDICINE

## 2025-06-20 PROCEDURE — 93005 ELECTROCARDIOGRAM TRACING: CPT

## 2025-06-20 PROCEDURE — 85379 FIBRIN DEGRADATION QUANT: CPT | Performed by: EMERGENCY MEDICINE

## 2025-06-20 PROCEDURE — 99285 EMERGENCY DEPT VISIT HI MDM: CPT | Performed by: EMERGENCY MEDICINE

## 2025-06-20 RX ORDER — ASPIRIN 81 MG/1
243 TABLET, CHEWABLE ORAL ONCE
Status: COMPLETED | OUTPATIENT
Start: 2025-06-20 | End: 2025-06-20

## 2025-06-20 RX ORDER — SODIUM CHLORIDE 9 MG/ML
3 INJECTION INTRAVENOUS
Status: DISCONTINUED | OUTPATIENT
Start: 2025-06-20 | End: 2025-06-20 | Stop reason: HOSPADM

## 2025-06-20 RX ADMIN — ASPIRIN 243 MG: 81 TABLET, CHEWABLE ORAL at 16:59

## 2025-06-20 NOTE — ED PROVIDER NOTES
Time reflects when diagnosis was documented in both MDM as applicable and the Disposition within this note       Time User Action Codes Description Comment    6/20/2025  6:35 PM Mason Chery Add [R07.9] Chest pain, unspecified           ED Disposition       ED Disposition   Discharge    Condition   Stable    Date/Time   Fri Jun 20, 2025  6:35 PM    Comment   José BRITTANEY Karen discharge to home/self care.                   Assessment & Plan       Medical Decision Making    MDM/DDx: Chest pressure - ACS/MI, GERD, less likely but at risk for costochondritis, pneumonia, pneumothorax or PE.     I independently reviewed and interpreted ordered labs, EKG and CXR from this encounter.    A/P: Will check cardiac w/u plus D-dimer, treat symptoms, reevaluate for disposition.    Amount and/or Complexity of Data Reviewed  Labs: ordered. Decision-making details documented in ED Course.  Radiology: ordered and independent interpretation performed. Decision-making details documented in ED Course.  ECG/medicine tests: ordered and independent interpretation performed. Decision-making details documented in ED Course.    Risk  OTC drugs.  Prescription drug management.        ED Course as of 06/20/25 1837   Fri Jun 20, 2025   1650 D-Dimer, Quant(!): 0.52  Noted. Minimally elevated. Acceptable for age-adjustment. Awaiting trop. Will monitor for signs concerning for PE.   1653 hs TnI 0hr: 9  Was [id number] when he underwent PCI in 2022. Will monitor and obtain delta.    1835 Results reviewed with patient. Feels better. All questions answered to the patient's satisfaction.  Recommend continued supportive treatment at home and follow up with PCP.         Medications   sodium chloride (PF) 0.9 % injection 3 mL (has no administration in time range)   aspirin chewable tablet 243 mg (243 mg Oral Given 6/20/25 1659)       ED Risk Strat Scores   HEART Risk Score      Flowsheet Row Most Recent Value   Heart Score Risk Calculator    History 1 Filed at:  06/20/2025 1553   ECG 0 Filed at: 06/20/2025 1553   Age 1 Filed at: 06/20/2025 1553   Risk Factors 2 Filed at: 06/20/2025 1553   Troponin 0 Filed at: 06/20/2025 1553   HEART Score 4 Filed at: 06/20/2025 1553          HEART Risk Score      Flowsheet Row Most Recent Value   Heart Score Risk Calculator    History 1 Filed at: 06/20/2025 1553   ECG 0 Filed at: 06/20/2025 1553   Age 1 Filed at: 06/20/2025 1553   Risk Factors 2 Filed at: 06/20/2025 1553   Troponin 0 Filed at: 06/20/2025 1553   HEART Score 4 Filed at: 06/20/2025 1553                      No data recorded        SBIRT 20yo+      Flowsheet Row Most Recent Value   Initial Alcohol Screen: US AUDIT-C     1. How often do you have a drink containing alcohol? 0 Filed at: 06/20/2025 1548   2. How many drinks containing alcohol do you have on a typical day you are drinking?  0 Filed at: 06/20/2025 1548   3a. Male UNDER 65: How often do you have five or more drinks on one occasion? 0 Filed at: 06/20/2025 1548   Audit-C Score 0 Filed at: 06/20/2025 1548   ABILIO: How many times in the past year have you...    Used an illegal drug or used a prescription medication for non-medical reasons? Never Filed at: 06/20/2025 1548                            History of Present Illness       Chief Complaint   Patient presents with    Chest Pain     Patient reports constant left sided chest pressure for the past four hours.        Past Medical History[1]   Past Surgical History[2]   Family History[3]   Social History[4]   E-Cigarette/Vaping    E-Cigarette Use Never User       E-Cigarette/Vaping Substances    Nicotine No     THC No     CBD No     Flavoring No     Other No     Unknown No       I have reviewed and agree with the history as documented.     60-year-old male presents for evaluation of left-sided chest pressure that feels milder but similar to when he needed three stents in two vessels in 2022.  He last saw his cardiologist at his 1 year post PCI visit when Michelle was  stopped.  He has continued aspirin and Plavix daily since then, including this morning.  At that visit in 2023, he reported he was down to smoking half a pack a day.  However, he states he is now back to closer to 1 pack/day, stating it is difficult to quit in a house full of smokers.  She denies other associated cardiac symptoms with this chest pressure.  Of note, he had no other cardiac symptoms when he needed his stents.  He did not take anything additional since the chest pressure started.    No recent travel or sick contacts. Denies f/c, HA, LH/dizziness, unexplained diaphoresis, SOB, abdominal pain, n/v/d. 12 system ROS o/w negative.         History provided by:  Patient and medical records  Chest Pain  Pain location:  L chest  Pain quality: pressure    Pain radiates to:  Does not radiate  Pain radiates to the back: no    Pain severity:  No pain  Onset quality:  Sudden  Duration:  4 hours  Timing:  Constant  Progression:  Unchanged  Chronicity:  Recurrent  Context: at rest    Relieved by:  None tried  Worsened by:  Nothing tried  Ineffective treatments:  None tried  Associated symptoms: fatigue    Associated symptoms: no abdominal pain, no altered mental status, no back pain, no claudication, no cough, no diaphoresis, no dizziness, no fever, no headache, no lower extremity edema, no nausea, no near-syncope, no numbness, no palpitations, no shortness of breath, no syncope, not vomiting and no weakness    Risk factors: coronary artery disease, male sex and smoking        Review of Systems   Constitutional:  Positive for fatigue. Negative for activity change, appetite change, chills, diaphoresis and fever.   HENT:  Negative for rhinorrhea and sore throat.    Respiratory:  Negative for cough, shortness of breath and wheezing.    Cardiovascular:  Positive for chest pain. Negative for palpitations, claudication, leg swelling, syncope and near-syncope.   Gastrointestinal:  Negative for abdominal distention, abdominal  pain, constipation, diarrhea, nausea and vomiting.   Genitourinary:  Negative for difficulty urinating, dysuria, flank pain, frequency, hematuria and urgency.   Musculoskeletal:  Negative for back pain and neck pain.   Skin:  Negative for pallor and rash.   Neurological:  Negative for dizziness, syncope, weakness, light-headedness, numbness and headaches.   Hematological:  Negative for adenopathy.   Psychiatric/Behavioral:  Negative for confusion.    All other systems reviewed and are negative.          Objective       ED Triage Vitals [06/20/25 1548]   Temperature Pulse Blood Pressure Respirations SpO2 Patient Position - Orthostatic VS   98 °F (36.7 °C) 94 163/97 18 97 % Lying      Temp Source Heart Rate Source BP Location FiO2 (%) Pain Score    Temporal Monitor Right arm -- 5      Vitals      Date and Time Temp Pulse SpO2 Resp BP Pain Score FACES Pain Rating User   06/20/25 1800 -- 78 96 % 20 176/89 1 -- CD   06/20/25 1700 -- 86 96 % 18 160/80 -- -- PP   06/20/25 1548 98 °F (36.7 °C) 94 97 % 18 163/97 5 -- PP            Physical Exam  Vitals reviewed.   Constitutional:       General: He is not in acute distress.     Appearance: He is well-developed. He is not ill-appearing, toxic-appearing or diaphoretic.   HENT:      Head: Normocephalic and atraumatic.      Right Ear: External ear normal.      Left Ear: External ear normal.      Nose: Nose normal.      Mouth/Throat:      Mouth: Mucous membranes are moist.      Pharynx: Oropharynx is clear. No oropharyngeal exudate.     Eyes:      General: No scleral icterus.     Conjunctiva/sclera: Conjunctivae normal.      Pupils: Pupils are equal, round, and reactive to light.     Neck:      Vascular: No carotid bruit.     Cardiovascular:      Rate and Rhythm: Normal rate and regular rhythm.      Heart sounds: No murmur heard.  Pulmonary:      Effort: Pulmonary effort is normal. No respiratory distress.      Breath sounds: Normal breath sounds.   Chest:      Chest wall: No  tenderness.   Abdominal:      General: Bowel sounds are normal. There is no distension.      Palpations: Abdomen is soft.      Tenderness: There is no abdominal tenderness.     Musculoskeletal:         General: No tenderness. Normal range of motion.      Cervical back: Normal range of motion and neck supple.      Right lower leg: No edema.      Left lower leg: No edema.   Lymphadenopathy:      Cervical: No cervical adenopathy.     Skin:     General: Skin is warm and dry.      Coloration: Skin is not pale.      Findings: No erythema or rash.     Neurological:      General: No focal deficit present.      Mental Status: He is alert and oriented to person, place, and time.      Motor: No abnormal muscle tone.      Deep Tendon Reflexes: Reflexes are normal and symmetric.     Psychiatric:         Mood and Affect: Mood normal.         Behavior: Behavior normal.         Thought Content: Thought content normal.         Results Reviewed       Procedure Component Value Units Date/Time    HS Troponin I 2hr [[id number]]  (Normal) Collected: 06/20/25 1804    Lab Status: Final result Specimen: Blood from Arm, Left Updated: 06/20/25 1832     hs TnI 2hr 3 ng/L      Delta 2hr hsTnI -6 ng/L     HS Troponin I 4hr [[id number]]     Lab Status: No result Specimen: Blood     HS Troponin 0hr (reflex protocol) [[id number]]  (Normal) Collected: 06/20/25 1550    Lab Status: Final result Specimen: Blood from Arm, Left Updated: 06/20/25 1652     hs TnI 0hr 9 ng/L     D-dimer, quantitative [[id number]]  (Abnormal) Collected: 06/20/25 1550    Lab Status: Final result Specimen: Blood from Arm, Left Updated: 06/20/25 1647     D-Dimer, Quant 0.52 ug/ml FEU     Narrative:      In the evaluation for possible pulmonary embolism, in the appropriate (Well's Score of 4 or less) patient, the age adjusted d-dimer cutoff for this patient can be calculated as:    Age x 0.01 (in ug/mL) for Age-adjusted D-dimer exclusion threshold for a patient over 50 years.     Basic metabolic panel [[id number]] Collected: 06/20/25 1550    Lab Status: Final result Specimen: Blood from Arm, Left Updated: 06/20/25 1646     Sodium 138 mmol/L      Potassium 3.8 mmol/L      Chloride 104 mmol/L      CO2 24 mmol/L      ANION GAP 10 mmol/L      BUN 12 mg/dL      Creatinine 1.07 mg/dL      Glucose 122 mg/dL      Calcium 9.4 mg/dL      eGFR 75 ml/min/1.73sq m     Narrative:      [employer] Kid[employer] guidelines for Chronic Kidney Disease (CKD):     Stage 1 with normal or high GFR (GFR > 90 mL/min/1.73 square meters)    Stage 2 Mild CKD (GFR = 60-89 mL/min/1.73 square meters)    Stage 3A Moderate CKD (GFR = 45-59 mL/min/1.73 square meters)    Stage 3B Moderate CKD (GFR = 30-44 mL/min/1.73 square meters)    Stage 4 Severe CKD (GFR = 15-29 mL/min/1.73 square meters)    Stage 5 End Stage CKD (GFR <15 mL/min/1.73 square meters)  Note: GFR calculation is accurate only with a steady state creatinine    CBC and differential [[id number]]  (Abnormal) Collected: 06/20/25 1550    Lab Status: Final result Specimen: Blood from Arm, Left Updated: 06/20/25 1624     WBC 6.92 Thousand/uL      RBC 5.23 Million/uL      Hemoglobin 15.9 g/dL      Hematocrit 47.5 %      MCV 91 fL      MCH 30.4 pg      MCHC 33.5 g/dL      RDW 13.1 %      MPV 8.5 fL      Platelets 291 Thousands/uL      nRBC 0 /100 WBCs      Segmented % 64 %      Immature Grans % 0 %      Lymphocytes % 24 %      Monocytes % 10 %      Eosinophils Relative 2 %      Basophils Relative 0 %      Absolute Neutrophils 4.38 Thousands/µL      Absolute Immature Grans 0.01 Thousand/uL      Absolute Lymphocytes 1.69 Thousands/µL      Absolute Monocytes 0.66 Thousand/µL      Eosinophils Absolute 0.15 Thousand/µL      Basophils Absolute 0.03 Thousands/µL             X-ray chest 2 views   ED Interpretation by Mason Chery DO (06/20 1644)   No acute cardiopulmonary pathology.          ECG 12 Lead Documentation Only    Date/Time: 6/20/2025 3:52 PM    Performed  by: Mason Chery DO  Authorized by: Mason Chery DO    Indications / Diagnosis:  Chest pressure  ECG reviewed by me, the ED Provider: yes    Patient location:  ED  Interpretation:     Interpretation: abnormal    Rate:     ECG rate:  93    ECG rate assessment: normal    Rhythm:     Rhythm: sinus rhythm    Ectopy:     Ectopy: none    QRS:     QRS axis:  Left  ST segments:     ST segments:  Non-specific  T waves:     T waves: normal    ECG 12 Lead Documentation Only    Date/Time: 6/20/2025 6:10 PM    Performed by: Mason Chery DO  Authorized by: Mason Chery DO    Indications / Diagnosis:  Delta for chest pain  ECG reviewed by me, the ED Provider: yes    Patient location:  ED  Previous ECG:     Previous ECG:  Compared to current    Similarity:  No change  Interpretation:     Interpretation: abnormal    Rate:     ECG rate:  82    ECG rate assessment: normal    Rhythm:     Rhythm: sinus rhythm    Ectopy:     Ectopy: none    Conduction:     Conduction: normal    ST segments:     ST segments:  Non-specific  T waves:     T waves: normal        ED Medication and Procedure Management   Prior to Admission Medications   Prescriptions Last Dose Informant Patient Reported? Taking?   aspirin (ECOTRIN LOW STRENGTH) 81 mg EC tablet   No No   Sig: Take 1 tablet (81 mg total) by mouth daily   atorvastatin (LIPITOR) 80 mg tablet   No No   Sig: take 1 tablet by mouth daily with dinner   metoprolol succinate (TOPROL-XL) 50 mg 24 hr tablet   No No   Sig: take 1 tablet by mouth twice a day   nitroglycerin (NITROSTAT) 0.4 mg SL tablet   No No   Sig: Place 1 tablet (0.4 mg total) under the tongue every 5 (five) minutes as needed for chest pain   ticagrelor (BRILINTA) 90 MG   No No   Sig: Take 1 tablet (90 mg total) by mouth 2 (two) times a day      Facility-Administered Medications: None     Patient's Medications   Discharge Prescriptions    No medications on file     No discharge procedures on file.  ED SEPSIS DOCUMENTATION   Time  reflects when diagnosis was documented in both MDM as applicable and the Disposition within this note       Time User Action Codes Description Comment    6/20/2025  6:35 PM Mason Chery Add [R07.9] Chest pain, unspecified                    [1]   Past Medical History:  Diagnosis Date    Acute ST elevation myocardial infarction (STEMI) of anterolateral wall     Coronary artery disease     anterolateral STEMI involving the LAD, status post PCI    Mixed hyperlipidemia    [2]   Past Surgical History:  Procedure Laterality Date    CARDIAC CATHETERIZATION N/A 7/13/2022    Procedure: Cardiac pci;  Surgeon: Harish Calderon MD;  [facility];  Service: Cardiology    CARDIAC CATHETERIZATION N/A 7/13/2022    Procedure: Cardiac Coronary Angiogram;  Surgeon: Harish Calderon MD;  [facility];  Service: Cardiology    CARDIAC CATHETERIZATION  7/13/2022    Procedure: Cardiac catheterization;  Surgeon: Harish Calderon MD;  [facility];  Service: Cardiology    CARDIAC CATHETERIZATION N/A 8/12/2022    Procedure: Cardiac pci;  Surgeon: Harish Calderon MD;  [facility];  Service: Cardiology    CARDIAC CATHETERIZATION N/A 8/12/2022    Procedure: Cardiac Coronary Angiogram;  Surgeon: Harish Calderon MD;  [facility];  Service: Cardiology    CARDIAC CATHETERIZATION N/A 8/12/2022    Procedure: Cardiac catheterization;  Surgeon: Harish Calderon MD;  [facility];  Service: Cardiology   [3]   Family History  Problem Relation Name Age of Onset    No Known Problems Mother      Coronary artery disease Father          CABG    Heart attack Brother     [4]   Social History  Tobacco Use    Smoking status: Every Day     Current packs/day: 0.50     Types: Cigarettes    Smokeless tobacco: Never   Vaping Use    Vaping status: Never Used   Substance Use Topics    Alcohol use: No    Drug use: No        Mason Chery DO  06/20/25 1702

## (undated) DEVICE — TR BAND RADIAL ARTERY COMPRESSION DEVICE: Brand: TR BAND

## (undated) DEVICE — CATH DIAG 5FR IMPULSE 100CM FR4

## (undated) DEVICE — DGW .035 FC J3MM 260CM TEF: Brand: EMERALD

## (undated) DEVICE — GUIDEWIRE WHOLEY HI TORQUE INTERM MOD J .035 145CM

## (undated) DEVICE — GLIDESHEATH BASIC HYDROPHILIC COATED INTRODUCER SHEATH: Brand: GLIDESHEATH

## (undated) DEVICE — RUNTHROUGH NS EXTRA FLOPPY PTCA GUIDEWIRE: Brand: RUNTHROUGH

## (undated) DEVICE — CATH GUIDE LAUNCHER 6FR EBU 3.5

## (undated) DEVICE — GLIDESHEATH SLENDER STAINLESS STEEL KIT: Brand: GLIDESHEATH SLENDER

## (undated) DEVICE — NC TREK CORONARY DILATATION CATHETER 2.5 MM X 15 MM / RAPID-EXCHANGE: Brand: NC TREK

## (undated) DEVICE — MINI TREK CORONARY DILATATION CATHETER 2.0 MM X 15 MM / RAPID-EXCHANGE: Brand: MINI TREK

## (undated) DEVICE — RADIFOCUS OPTITORQUE ANGIOGRAPHIC CATHETER: Brand: OPTITORQUE